# Patient Record
Sex: FEMALE | Race: BLACK OR AFRICAN AMERICAN | NOT HISPANIC OR LATINO | Employment: OTHER | URBAN - METROPOLITAN AREA
[De-identification: names, ages, dates, MRNs, and addresses within clinical notes are randomized per-mention and may not be internally consistent; named-entity substitution may affect disease eponyms.]

---

## 2017-01-06 ENCOUNTER — APPOINTMENT (OUTPATIENT)
Dept: WOMENS IMAGING | Facility: HOSPITAL | Age: 72
End: 2017-01-06

## 2017-01-06 ENCOUNTER — OFFICE VISIT (OUTPATIENT)
Dept: INTERNAL MEDICINE | Facility: CLINIC | Age: 72
End: 2017-01-06

## 2017-01-06 VITALS
SYSTOLIC BLOOD PRESSURE: 124 MMHG | DIASTOLIC BLOOD PRESSURE: 72 MMHG | WEIGHT: 196 LBS | BODY MASS INDEX: 31.5 KG/M2 | HEIGHT: 66 IN

## 2017-01-06 DIAGNOSIS — Z12.39 SCREENING FOR BREAST CANCER: ICD-10-CM

## 2017-01-06 DIAGNOSIS — I10 ESSENTIAL HYPERTENSION: ICD-10-CM

## 2017-01-06 DIAGNOSIS — I10 ESSENTIAL HYPERTENSION: Primary | ICD-10-CM

## 2017-01-06 DIAGNOSIS — E78.00 HYPERCHOLESTEROLEMIA: ICD-10-CM

## 2017-01-06 PROCEDURE — G0202 SCR MAMMO BI INCL CAD: HCPCS

## 2017-01-06 PROCEDURE — G0202 SCR MAMMO BI INCL CAD: HCPCS | Performed by: RADIOLOGY

## 2017-01-06 PROCEDURE — 99213 OFFICE O/P EST LOW 20 MIN: CPT

## 2017-01-06 RX ORDER — HYDROCHLOROTHIAZIDE 25 MG/1
12.5 TABLET ORAL DAILY
Qty: 90 TABLET | Refills: 3
Start: 2017-01-06 | End: 2017-11-15 | Stop reason: ALTCHOICE

## 2017-01-06 NOTE — MR AVS SNAPSHOT
Tanya Ahnorkimmy   1/6/2017 10:00 AM   Office Visit    Dept Phone:  [phone]   Encounter #:  [id number]    Provider:  RADIOLOGY HAN YIP   Department:  [facility] INTERNAL MEDICINE                Your Full Care Plan              Today's Medication Changes          These changes are accurate as of: 1/6/17 11:52 AM.  If you have any questions, ask your nurse or doctor.               Medication(s)that have changed:     hydrochlorothiazide 25 MG tablet   Commonly known as:  HYDRODIURIL   Take 0.5 tablets by mouth Daily.   What changed:  how much to take   Changed by:  Ajith Bauer MD            Where to Get Your Medications      Information about where to get these medications is not yet available     ! Ask your nurse or doctor about these medications     hydrochlorothiazide 25 MG tablet                  Your Updated Medication List          This list is accurate as of: 1/6/17 11:52 AM.  Always use your most recent med list.                amLODIPine 2.5 MG tablet   Commonly known as:  NORVASC   Take 1 tablet by mouth daily.       aspirin 81 MG chewable tablet       atorvastatin 20 MG tablet   Commonly known as:  LIPITOR       ferrous sulfate 325 (65 FE) MG tablet       hydrochlorothiazide 25 MG tablet   Commonly known as:  HYDRODIURIL   Take 0.5 tablets by mouth Daily.       irbesartan 300 MG tablet   Commonly known as:  AVAPRO       metoprolol succinate XL 50 MG 24 hr tablet   Commonly known as:  TOPROL-XL       promethazine-codeine 6.25-10 MG/5ML syrup   Commonly known as:  PHENERGAN with CODEINE   TAKE 1-2 TEASPOONFUL BY MOUTH EVERY 4 HOURS AS NEEDED       Vitamin D 2000 UNITS capsule               We Performed the Following     Mammo Screening Bilateral With CAD       You Were Diagnosed With        Codes Comments    Screening for breast cancer     ICD-10-CM: Z12.39  ICD-9-CM: V76.10 last mammogram 8/2015      Instructions     None    Patient Instructions  History      Upcoming Appointments     Visit Type Date Time Department    FOLLOW UP 2017 10:30 AM [facility]    MAMMO SCREEN BILATERAL 2017 10:00 AM [facility]    FOLLOW UP 4/10/2017  9:45 AM [facility]    FOLLOW UP 2017  8:00 AM [facility]      [employer] Signup     [facility] [employer] allows you to send messages to your doctor, view your test results, renew your prescriptions, schedule appointments, and more. To sign up, go to [employer] and click on the Sign Up Now link in the New User? box. Enter your [employer] Activation Code exactly as it appears below along with the last four digits of your Social Security Number and your Date of Birth () to complete the sign-up process. If you do not sign up before the expiration date, you must request a new code.    [employer] Activation Code: [email or web address]  Expires: 2017 11:09 AM    If you have questions, you can email [email or web address] or call [phone] to talk to our [employer] staff. Remember, [employer] is NOT to be used for urgent needs. For medical emergencies, dial 911.               Other Info from Your Visit           Your Appointments     Apr 10, 2017  9:45 AM EDT   Follow Up with NAREN Wilkins   [facility] INTERNAL MEDICINE (--)    [address]  [facility] [address] [phone]           Arrive 15 minutes prior to appointment.            Dec 19, 2017  8:00 AM EST   Follow Up with Concepcion Wright MD   [facility] CARDIOLOGY (--)    [address]  [facility] [address] [phone]           Arrive 15 minutes prior to appointment.              Allergies     No Known Allergies      Vital Signs     Smoking Status                   Former Smoker           Problems and Diagnoses Noted     Breast cancer screening          Results     Mammo Screening Bilateral With CAD

## 2017-01-06 NOTE — MR AVS SNAPSHOT
Tanya Greenwood   1/6/2017 10:30 AM   Office Visit    Dept Phone:  [phone]   Encounter #:  [id number]    Provider:  Ajith Bauer MD   Department:  [facility] INTERNAL MEDICINE                Your Full Care Plan              Today's Medication Changes          These changes are accurate as of: 1/6/17 11:10 AM.  If you have any questions, ask your nurse or doctor.               Medication(s)that have changed:     hydrochlorothiazide 25 MG tablet   Commonly known as:  HYDRODIURIL   Take 0.5 tablets by mouth Daily.   What changed:  how much to take   Changed by:  Ajith Bauer MD            Where to Get Your Medications      Information about where to get these medications is not yet available     ! Ask your nurse or doctor about these medications     hydrochlorothiazide 25 MG tablet                  Your Updated Medication List          This list is accurate as of: 1/6/17 11:10 AM.  Always use your most recent med list.                amLODIPine 2.5 MG tablet   Commonly known as:  NORVASC   Take 1 tablet by mouth daily.       aspirin 81 MG chewable tablet       atorvastatin 20 MG tablet   Commonly known as:  LIPITOR       ferrous sulfate 325 (65 FE) MG tablet       hydrochlorothiazide 25 MG tablet   Commonly known as:  HYDRODIURIL   Take 0.5 tablets by mouth Daily.       irbesartan 300 MG tablet   Commonly known as:  AVAPRO       metoprolol succinate XL 50 MG 24 hr tablet   Commonly known as:  TOPROL-XL       promethazine-codeine 6.25-10 MG/5ML syrup   Commonly known as:  PHENERGAN with CODEINE   TAKE 1-2 TEASPOONFUL BY MOUTH EVERY 4 HOURS AS NEEDED       Vitamin D 2000 UNITS capsule               You Were Diagnosed With        Codes Comments    Essential hypertension    -  Primary ICD-10-CM: I10  ICD-9-CM: 401.9 BP possibly over controlled: I rec a decrease in the dose of the diuretic. Monitor supne and sitting BP.    Hypercholesterolemia     ICD-10-CM:  E78.00  ICD-9-CM: 272.0     Essential hypertension     ICD-10-CM: I10  ICD-9-CM: 401.9 Rx HCTZ 25 mgm daily      Instructions     None    Patient Instructions History      Upcoming Appointments     Visit Type Date Time Department    FOLLOW UP 2017 10:30 AM [facility]    MAMMO SCREEN BILATERAL 2017 10:00 AM [facility]    FOLLOW UP 4/10/2017  9:45 AM [facility]    FOLLOW UP 2017  8:00 AM [facility]      [employer] Signup     [facility] [employer] allows you to send messages to your doctor, view your test results, renew your prescriptions, schedule appointments, and more. To sign up, go to [employer] and click on the Sign Up Now link in the New User? box. Enter your [employer] Activation Code exactly as it appears below along with the last four digits of your Social Security Number and your Date of Birth () to complete the sign-up process. If you do not sign up before the expiration date, you must request a new code.    [employer] Activation Code: [email or web address]  Expires: 2017 11:09 AM    If you have questions, you can email [email or web address] or call [phone] to talk to our [employer] staff. Remember, [employer] is NOT to be used for urgent needs. For medical emergencies, dial 911.               Other Info from Your Visit           Your Appointments     Apr 10, 2017  9:45 AM EDT   Follow Up with NAREN Wilkins   [facility] INTERNAL MEDICINE (--)    [address]  [facility] [place] [address] [phone]           Arrive 15 minutes prior to appointment.            Dec 19, 2017  8:00 AM EST   Follow Up with Concepcion Wright MD   [facility] CARDIOLOGY (--)    [address]  [facility] [address] [phone]           Arrive 15 minutes prior to appointment.              Allergies     No Known Allergies      Reason for Visit     Hypertension     Hyperlipidemia           Vital Signs     Blood  "Pressure Height Weight Body Mass Index Smoking Status       124/72 (BP Location: Left arm, Patient Position: Sitting, Cuff Size: Adult) 66\" (167.6 cm) 196 lb (88.9 kg) 31.64 kg/m2 Former Smoker       Problems and Diagnoses Noted     High blood pressure    High cholesterol        "

## 2017-01-06 NOTE — PROGRESS NOTES
"Sahara Greenwood is a 71 y.o. female.     Hypertension   This is a chronic problem. The problem is controlled. Associated symptoms include palpitations (mild:resolved). Pertinent negatives include no anxiety, blurred vision, chest pain (resolved), headaches, orthopnea, peripheral edema or shortness of breath. (\"rarly am dizziness; gets better later in fday) There are no associated agents to hypertension. Risk factors for coronary artery disease include dyslipidemia and post-menopausal state. Past treatments include angiotensin blockers, calcium channel blockers and diuretics. The current treatment provides significant improvement. There are no compliance problems.  There is no history of angina, kidney disease or CAD/MI.   Hyperlipidemia   This is a chronic problem. The problem is controlled. Pertinent negatives include no chest pain (resolved) or shortness of breath. Treatments tried: tolerating the atorvastatin.        The following portions of the patient's history were reviewed and updated as appropriate: allergies, current medications, past family history, past medical history, past social history, past surgical history and problem list.    Review of Systems   Constitutional: Negative for chills, fatigue, fever and unexpected weight change.   HENT: Negative for congestion, ear pain, postnasal drip, sinus pressure, sore throat and trouble swallowing.    Eyes: Negative for blurred vision and visual disturbance.   Respiratory: Negative for cough, chest tightness, shortness of breath and wheezing.    Cardiovascular: Positive for palpitations (mild:resolved). Negative for chest pain (resolved), orthopnea and leg swelling.   Gastrointestinal: Negative for abdominal pain, blood in stool, nausea and vomiting.   Endocrine: Negative for cold intolerance and heat intolerance.   Genitourinary: Negative for dysuria, frequency and urgency.   Musculoskeletal: Negative for arthralgias and joint swelling.   Skin: " Negative for color change.   Allergic/Immunologic: Negative for immunocompromised state.   Neurological: Negative for syncope, weakness and headaches.   Hematological: Does not bruise/bleed easily.       Objective   Physical Exam   Constitutional: She is oriented to person, place, and time. She appears well-developed and well-nourished. No distress.   HENT:   Head: Normocephalic and atraumatic.   Right Ear: External ear normal.   Left Ear: External ear normal.   Eyes: Conjunctivae and EOM are normal. Pupils are equal, round, and reactive to light. No scleral icterus.   Neck: Normal range of motion. Neck supple. No thyromegaly present.   Cardiovascular: Normal rate, regular rhythm, normal heart sounds and intact distal pulses.  Exam reveals no gallop and no friction rub.    No murmur heard.  Pulmonary/Chest: Effort normal and breath sounds normal. No respiratory distress.   Lymphadenopathy:     She has no cervical adenopathy.   Neurological: She is alert and oriented to person, place, and time.   Skin: Skin is warm and dry. No rash noted. No erythema.   Psychiatric: She has a normal mood and affect. Her behavior is normal.   Nursing note and vitals reviewed.      Assessment/Plan   Tanya was seen today for hypertension and hyperlipidemia.    Diagnoses and all orders for this visit:    Essential hypertension  Comments:  BP possibly over controlled: I rec a decrease in the dose of the diuretic. Monitor supne and sitting BP.  Orders:  -     hydrochlorothiazide (HYDRODIURIL) 25 MG tablet; Take 0.5 tablets by mouth Daily.    Hypercholesterolemia    Essential hypertension  Comments:  Rx HCTZ 25 mgm daily  Orders:  -     hydrochlorothiazide (HYDRODIURIL) 25 MG tablet; Take 0.5 tablets by mouth Daily.

## 2017-02-07 RX ORDER — METOPROLOL SUCCINATE 50 MG/1
TABLET, EXTENDED RELEASE ORAL
Qty: 30 TABLET | Refills: 2 | Status: SHIPPED | OUTPATIENT
Start: 2017-02-07 | End: 2017-05-07 | Stop reason: SDUPTHER

## 2017-03-26 DIAGNOSIS — I10 ESSENTIAL HYPERTENSION: ICD-10-CM

## 2017-03-27 RX ORDER — AMLODIPINE BESYLATE 2.5 MG/1
TABLET ORAL
Qty: 90 TABLET | Refills: 0 | Status: SHIPPED | OUTPATIENT
Start: 2017-03-27 | End: 2017-07-13 | Stop reason: SDUPTHER

## 2017-04-10 ENCOUNTER — TELEPHONE (OUTPATIENT)
Dept: INTERNAL MEDICINE | Facility: CLINIC | Age: 72
End: 2017-04-10

## 2017-04-10 ENCOUNTER — OFFICE VISIT (OUTPATIENT)
Dept: INTERNAL MEDICINE | Facility: CLINIC | Age: 72
End: 2017-04-10

## 2017-04-10 VITALS
OXYGEN SATURATION: 97 % | BODY MASS INDEX: 31.66 KG/M2 | SYSTOLIC BLOOD PRESSURE: 112 MMHG | HEART RATE: 54 BPM | WEIGHT: 197 LBS | DIASTOLIC BLOOD PRESSURE: 76 MMHG | HEIGHT: 66 IN

## 2017-04-10 DIAGNOSIS — I10 ESSENTIAL HYPERTENSION: Primary | ICD-10-CM

## 2017-04-10 DIAGNOSIS — E78.00 HYPERCHOLESTEROLEMIA: ICD-10-CM

## 2017-04-10 PROCEDURE — 99213 OFFICE O/P EST LOW 20 MIN: CPT | Performed by: NURSE PRACTITIONER

## 2017-04-10 NOTE — PATIENT INSTRUCTIONS
Fat and Cholesterol Restricted Diet  High levels of fat and cholesterol in your blood may lead to various health problems, such as diseases of the heart, blood vessels, gallbladder, liver, and pancreas. Fats are concentrated sources of energy that come in various forms. Certain types of fat, including saturated fat, may be harmful in excess. Cholesterol is a substance needed by your body in small amounts. Your body makes all the cholesterol it needs. Excess cholesterol comes from the food you eat.  When you have high levels of cholesterol and saturated fat in your blood, health problems can develop because the excess fat and cholesterol will gather along the walls of your blood vessels, causing them to narrow. Choosing the right foods will help you control your intake of fat and cholesterol. This will help keep the levels of these substances in your blood within normal limits and reduce your risk of disease.  WHAT IS MY PLAN?  Your health care provider recommends that you:  · Get no more than __________ % of the total calories in your daily diet from fat.  · Limit your intake of saturated fat to less than ______% of your total calories each day.  · Limit the amount of cholesterol in your diet to less than _________mg per day.  WHAT TYPES OF FAT SHOULD I CHOOSE?  · Choose healthy fats more often. Choose monounsaturated and polyunsaturated fats, such as olive and canola oil, flaxseeds, walnuts, almonds, and seeds.  · Eat more omega-3 fats. Good choices include salmon, mackerel, sardines, tuna, flaxseed oil, and ground flaxseeds. Aim to eat fish at least two times a week.  · Limit saturated fats. Saturated fats are primarily found in animal products, such as meats, butter, and cream. Plant sources of saturated fats include palm oil, palm kernel oil, and coconut oil.  · Avoid foods with partially hydrogenated oils in them. These contain trans fats. Examples of foods that contain trans fats are stick margarine, some tub  "margarines, cookies, crackers, and other baked goods.  WHAT GENERAL GUIDELINES DO I NEED TO FOLLOW?  These guidelines for healthy eating will help you control your intake of fat and cholesterol:  · Check food labels carefully to identify foods with trans fats or high amounts of saturated fat.  · Fill one half of your plate with vegetables and green salads.  · Fill one fourth of your plate with whole grains. Look for the word \"whole\" as the first word in the ingredient list.  · Fill one fourth of your plate with lean protein foods.  · Limit fruit to two servings a day. Choose fruit instead of juice.  · Eat more foods that contain soluble fiber. Examples of foods that contain this type of fiber are apples, broccoli, carrots, beans, peas, and barley. Aim to get 20-30 g of fiber per day.  · Eat more home-cooked food and less restaurant, buffet, and fast food.  · Limit or avoid alcohol.  · Limit foods high in starch and sugar.  · Limit fried foods.  · Cook foods using methods other than frying. Baking, boiling, grilling, and broiling are all great options.  · Lose weight if you are overweight. Losing just 5-10% of your initial body weight can help your overall health and prevent diseases such as diabetes and heart disease.  WHAT FOODS CAN I EAT?  Grains  Whole grains, such as whole wheat or whole grain breads, crackers, cereals, and pasta. Unsweetened oatmeal, bulgur, barley, quinoa, or brown rice. Corn or whole wheat flour tortillas.  Vegetables  Fresh or frozen vegetables (raw, steamed, roasted, or grilled). Green salads.  Fruits  All fresh, canned (in natural juice), or frozen fruits.  Meat and Other Protein Products  Ground beef (85% or leaner), grass-fed beef, or beef trimmed of fat. Skinless chicken or turkey. Ground chicken or turkey. Pork trimmed of fat. All fish and seafood. Eggs. Dried beans, peas, or lentils. Unsalted nuts or seeds. Unsalted canned or dry beans.  Dairy  Low-fat dairy products, such as skim or " 1% milk, 2% or reduced-fat cheeses, low-fat ricotta or cottage cheese, or plain low-fat yogurt.  Fats and Oils  Tub margarines without trans fats. Light or reduced-fat mayonnaise and salad dressings. Avocado. Olive, canola, sesame, or safflower oils. Natural peanut or almond butter (choose ones without added sugar and oil).  The items listed above may not be a complete list of recommended foods or beverages. Contact your dietitian for more options.  WHAT FOODS ARE NOT RECOMMENDED?  Grains  White bread. White pasta. White rice. Cornbread. Bagels, pastries, and croissants. Crackers that contain trans fat.  Vegetables  White potatoes. Corn. Creamed or fried vegetables. Vegetables in a cheese sauce.  Fruits  Dried fruits. Canned fruit in light or heavy syrup. Fruit juice.  Meat and Other Protein Products  Fatty cuts of meat. Ribs, chicken wings, muro, sausage, bologna, salami, chitterlings, fatback, hot dogs, bratwurst, and packaged luncheon meats. Liver and organ meats.  Dairy  Whole or 2% milk, cream, half-and-half, and cream cheese. Whole milk cheeses. Whole-fat or sweetened yogurt. Full-fat cheeses. Nondairy creamers and whipped toppings. Processed cheese, cheese spreads, or cheese curds.  Sweets and Desserts  Corn syrup, sugars, honey, and molasses. Candy. Jam and jelly. Syrup. Sweetened cereals. Cookies, pies, cakes, donuts, muffins, and ice cream.  Fats and Oils  Butter, stick margarine, lard, shortening, ghee, or muro fat. Coconut, palm kernel, or palm oils.  Beverages  Alcohol. Sweetened drinks (such as sodas, lemonade, and fruit drinks or punches).  The items listed above may not be a complete list of foods and beverages to avoid. Contact your dietitian for more information.     This information is not intended to replace advice given to you by your health care provider. Make sure you discuss any questions you have with your health care provider.     Document Released: 12/18/2006 Document Revised: 01/08/2016  Document Reviewed: 03/18/2015  [employer] Interactive Patient Education ©2016 [employer]

## 2017-04-10 NOTE — TELEPHONE ENCOUNTER
----- Message from NAREN Wilkins sent at 4/10/2017 11:04 AM EDT -----  Regarding: medical records  Please obtain recent  labs from Dr Clarence Sepulveda (nephrologist). Thanks .

## 2017-04-11 ENCOUNTER — TELEPHONE (OUTPATIENT)
Dept: INTERNAL MEDICINE | Facility: CLINIC | Age: 72
End: 2017-04-11

## 2017-04-11 DIAGNOSIS — E78.00 HYPERCHOLESTEROLEMIA: Primary | ICD-10-CM

## 2017-04-11 RX ORDER — EZETIMIBE 10 MG/1
10 TABLET ORAL DAILY
Qty: 30 TABLET | Refills: 3 | Status: SHIPPED | OUTPATIENT
Start: 2017-04-11 | End: 2017-08-17 | Stop reason: SDUPTHER

## 2017-04-11 NOTE — TELEPHONE ENCOUNTER
I called patient and informed her that I received lab work from nephrologist. Her cholesterol levels remain elevated and worst. Will restart zetia (previously stopped due to intermittent cramps, but she continue with them). She will also work on low fat/cholesterol diet. Will need to recheck levels at next office visit.

## 2017-05-08 RX ORDER — METOPROLOL SUCCINATE 50 MG/1
TABLET, EXTENDED RELEASE ORAL
Qty: 30 TABLET | Refills: 4 | Status: SHIPPED | OUTPATIENT
Start: 2017-05-08 | End: 2017-10-05 | Stop reason: SDUPTHER

## 2017-06-26 DIAGNOSIS — I10 ESSENTIAL HYPERTENSION: ICD-10-CM

## 2017-06-27 RX ORDER — AMLODIPINE BESYLATE 2.5 MG/1
TABLET ORAL
Qty: 90 TABLET | Refills: 1 | Status: SHIPPED | OUTPATIENT
Start: 2017-06-27 | End: 2018-03-14 | Stop reason: SDUPTHER

## 2017-07-13 ENCOUNTER — OFFICE VISIT (OUTPATIENT)
Dept: INTERNAL MEDICINE | Facility: CLINIC | Age: 72
End: 2017-07-13

## 2017-07-13 VITALS
DIASTOLIC BLOOD PRESSURE: 90 MMHG | HEIGHT: 66 IN | WEIGHT: 199 LBS | SYSTOLIC BLOOD PRESSURE: 136 MMHG | BODY MASS INDEX: 31.98 KG/M2

## 2017-07-13 DIAGNOSIS — Z00.00 PERIODIC HEALTH ASSESSMENT, GENERAL SCREENING, ADULT: Primary | ICD-10-CM

## 2017-07-13 DIAGNOSIS — F51.01 PRIMARY INSOMNIA: ICD-10-CM

## 2017-07-13 DIAGNOSIS — I10 ESSENTIAL HYPERTENSION: ICD-10-CM

## 2017-07-13 DIAGNOSIS — E78.00 HYPERCHOLESTEROLEMIA: ICD-10-CM

## 2017-07-13 PROCEDURE — G0439 PPPS, SUBSEQ VISIT: HCPCS

## 2017-07-13 PROCEDURE — 99213 OFFICE O/P EST LOW 20 MIN: CPT

## 2017-07-13 RX ORDER — DOXEPIN HYDROCHLORIDE 10 MG/1
10 CAPSULE ORAL NIGHTLY
Qty: 30 CAPSULE | Refills: 3 | Status: SHIPPED | OUTPATIENT
Start: 2017-07-13 | End: 2018-05-22

## 2017-07-13 NOTE — PROGRESS NOTES
QUICK REFERENCE INFORMATION:  The ABCs of the Annual Wellness Visit    Subsequent Medicare Wellness Visit    HEALTH RISK ASSESSMENT    1945    Recent Hospitalizations:  No hospitalization(s) within the last year..        Current Medical Providers:  Patient Care Team:  Ajith Bauer MD as PCP - General  NAREN Wilkins as PCP - Claims Attributed  Concepcion Wright MD as Consulting Physician (Cardiology)  Clarence Carvajal MD as Consulting Physician (Nephrology)        Smoking Status:  History   Smoking Status   • Former Smoker   • Types: Cigarettes   • Quit date: 1978   Smokeless Tobacco   • Never Used       Alcohol Consumption:  History   Alcohol Use No     Comment: caffeine use       Depression Screen:   PHQ-9 Depression Screening 7/13/2017   Little interest or pleasure in doing things 1   Feeling down, depressed, or hopeless 0   Trouble falling or staying asleep, or sleeping too much -   Feeling tired or having little energy -   Poor appetite or overeating -   Feeling bad about yourself - or that you are a failure or have let yourself or your family down -   Trouble concentrating on things, such as reading the newspaper or watching television -   Moving or speaking so slowly that other people could have noticed. Or the opposite - being so fidgety or restless that you have been moving around a lot more than usual -   Thoughts that you would be better off dead, or of hurting yourself in some way -   PHQ-9 Total Score 1   If you checked off any problems, how difficult have these problems made it for you to do your work, take care of things at home, or get along with other people? -       Health Habits and Functional and Cognitive Screening:  Functional & Cognitive Status 7/13/2017   Do you have difficulty preparing food and eating? No   Do you have difficulty bathing yourself? No   Do you have difficulty getting dressed? No   Do you have difficulty using the toilet? No   Do you have difficulty moving  around from place to place? No   In the past year have you fallen or experienced a near fall? No   Do you need help using the phone?  No   Are you deaf or do you have serious difficulty hearing?  No   Do you need help with transportation? No   Do you need help shopping? No   Do you need help preparing meals?  No   Do you need help with housework?  No   Do you need help with laundry? No   Do you need help taking your medications? No   Do you need help managing money? No   Do you have difficulty concentrating, remembering or making decisions? -       Health Habits  Current Diet: Unhealthy Diet  Dental Exam: Up to date  Eye Exam: Up to date  Exercise (times per week): 2 times per week  Current Exercise Activities Include: Walking      Does the patient have evidence of cognitive impairment? Yes    Aspirin use counseling: Taking ASA appropriately as indicated      Recent Lab Results:  CMP:  Lab Results   Component Value Date    BUN 13 09/30/2016    CREATININE 1.21 (H) 09/30/2016    EGFRIFNONA  09/22/2016      Comment:      <15 Indicative of kidney failure.    EGFRIFAFRI 53 (L) 09/30/2016    BCR 10.7 09/30/2016     09/30/2016    K 3.6 09/30/2016    CO2 25.7 09/30/2016    CALCIUM 10.1 09/30/2016    ALBUMIN 4.60 09/30/2016    LABIL2 1.6 09/30/2016    BILITOT 0.9 09/30/2016    ALKPHOS 71 09/30/2016    AST 19 09/30/2016    ALT 13 09/30/2016     Lipid Panel:  Lab Results   Component Value Date    CHOL 227 (H) 09/22/2016    TRIG 145 09/22/2016    HDL 47 09/22/2016    VLDL 29 09/22/2016    LDLCALC 151 (H) 09/22/2016    LDLHDL 3.21 09/22/2016     HbA1c:       Visual Acuity:  No exam data present    Age-appropriate Screening Schedule:  Refer to the list below for future screening recommendations based on patient's age, sex and/or medical conditions. Orders for these recommended tests are listed in the plan section. The patient has been provided with a written plan.    Health Maintenance   Topic Date Due   • ZOSTER VACCINE   04/04/2016   • INFLUENZA VACCINE  08/01/2017   • LIPID PANEL  09/22/2017   • PNEUMOCOCCAL VACCINES (65+ LOW/MEDIUM RISK) (2 of 2 - PPSV23) 10/25/2017   • DXA SCAN  09/15/2018   • MAMMOGRAM  01/06/2019   • COLONOSCOPY  05/31/2021   • TDAP/TD VACCINES (2 - Td) 10/25/2026        Subjective   History of Present Illness    Tanya Greenwood is a 72 y.o. female who presents for an Subsequent Wellness Visit.    The following portions of the patient's history were reviewed and updated as appropriate: allergies, current medications, past family history, past medical history, past social history, past surgical history and problem list.    Outpatient Medications Prior to Visit   Medication Sig Dispense Refill   • amLODIPine (NORVASC) 2.5 MG tablet TAKE 1 TABLET BY MOUTH DAILY 90 tablet 1   • aspirin 81 MG chewable tablet Chew 81 mg Daily.     • atorvastatin (LIPITOR) 20 MG tablet TK 1 T PO QHS  3   • Cholecalciferol (VITAMIN D) 2000 UNITS capsule Take  by mouth.     • ezetimibe (ZETIA) 10 MG tablet Take 1 tablet by mouth Daily. 30 tablet 3   • ferrous sulfate 325 (65 FE) MG tablet Take 65 mg by mouth Daily With Breakfast.     • hydrochlorothiazide (HYDRODIURIL) 25 MG tablet Take 0.5 tablets by mouth Daily. 90 tablet 3   • irbesartan (AVAPRO) 300 MG tablet 0.5 tablets Daily. Unless over 140 then one QD  3   • metoprolol succinate XL (TOPROL-XL) 50 MG 24 hr tablet TAKE 1/2 TABLET BY MOUTH TWICE DAILY 30 tablet 4   • amLODIPine (NORVASC) 2.5 MG tablet TAKE 1 TABLET BY MOUTH DAILY 90 tablet 0     No facility-administered medications prior to visit.        Patient Active Problem List   Diagnosis   • Hypercholesterolemia   • Essential hypertension   • Coronary artery disease   • Chronic renal insufficiency       Advance Care Planning:  has NO advance directive - information provided to the patient today    Identification of Risk Factors:  Risk factors include: weight , cardiovascular risk and Chronic insomnia.    Review of  "Systems    Compared to one year ago, the patient feels her physical health is better.  Compared to one year ago, the patient feels her mental health is better.    Objective     Physical Exam    Vitals:    07/13/17 0842   BP: 136/90   BP Location: Left arm   Weight: 199 lb (90.3 kg)   Height: 66\" (167.6 cm)       Body mass index is 32.12 kg/(m^2).  Discussed the patient's BMI with her. The BMI is above average; BMI management plan is completed.    Assessment/Plan   Patient Self-Management and Personalized Health Advice  The patient has been provided with information about: weight management and She is exercising almost daily and preventive services including:   · TdaP vaccine, Weight management..    Visit Diagnoses:  No diagnosis found.    No orders of the defined types were placed in this encounter.      Outpatient Encounter Prescriptions as of 7/13/2017   Medication Sig Dispense Refill   • amLODIPine (NORVASC) 2.5 MG tablet TAKE 1 TABLET BY MOUTH DAILY 90 tablet 1   • aspirin 81 MG chewable tablet Chew 81 mg Daily.     • atorvastatin (LIPITOR) 20 MG tablet TK 1 T PO QHS  3   • Cholecalciferol (VITAMIN D) 2000 UNITS capsule Take  by mouth.     • ezetimibe (ZETIA) 10 MG tablet Take 1 tablet by mouth Daily. 30 tablet 3   • ferrous sulfate 325 (65 FE) MG tablet Take 65 mg by mouth Daily With Breakfast.     • hydrochlorothiazide (HYDRODIURIL) 25 MG tablet Take 0.5 tablets by mouth Daily. 90 tablet 3   • irbesartan (AVAPRO) 300 MG tablet 0.5 tablets Daily. Unless over 140 then one QD  3   • metoprolol succinate XL (TOPROL-XL) 50 MG 24 hr tablet TAKE 1/2 TABLET BY MOUTH TWICE DAILY 30 tablet 4   • [DISCONTINUED] amLODIPine (NORVASC) 2.5 MG tablet TAKE 1 TABLET BY MOUTH DAILY 90 tablet 0     No facility-administered encounter medications on file as of 7/13/2017.        Reviewed use of high risk medication in the elderly: not applicable  Reviewed for potential of harmful drug interactions in the elderly: not " applicable    Follow Up:  No Follow-up on file.     An After Visit Summary and PPPS with all of these plans were given to the patient.

## 2017-07-13 NOTE — PROGRESS NOTES
Sahara Greenwood is a 72 y.o. female.     Hyperlipidemia   This is a chronic problem. The problem is controlled. Pertinent negatives include no chest pain or shortness of breath. Treatments tried: On Atorvastatin and describes episodic hand cramps. Risk factors for coronary artery disease include dyslipidemia, hypertension and post-menopausal.   Hypertension   This is a chronic problem. The current episode started more than 1 year ago. The problem has been resolved since onset. The problem is controlled. Pertinent negatives include no anxiety, chest pain, headaches, orthopnea, palpitations, peripheral edema or shortness of breath. There are no associated agents to hypertension. Past treatments include angiotensin blockers and diuretics. The current treatment provides significant improvement. There are no compliance problems.  Hypertensive end-organ damage includes CAD/MI.   Insomnia   This is a chronic problem. The current episode started more than 1 year ago (Primarily is difficulty going back to sleep after awakening in the night.). The problem occurs daily. The problem has been unchanged. Pertinent negatives include no abdominal pain, arthralgias, chest pain, chills, congestion, coughing, fatigue, fever, headaches, joint swelling, nausea, sore throat, vomiting or weakness.        The following portions of the patient's history were reviewed and updated as appropriate: allergies, current medications, past family history, past medical history, past social history, past surgical history and problem list.    Review of Systems   Constitutional: Negative for chills, fatigue, fever and unexpected weight change.   HENT: Negative for congestion, ear pain, postnasal drip, sinus pressure, sore throat and trouble swallowing.    Eyes: Negative for visual disturbance.   Respiratory: Negative for cough, chest tightness, shortness of breath and wheezing.    Cardiovascular: Negative for chest pain, palpitations,  orthopnea and leg swelling.   Gastrointestinal: Negative for abdominal pain, blood in stool, nausea and vomiting.   Endocrine: Negative for cold intolerance and heat intolerance.   Genitourinary: Negative for dysuria, frequency and urgency.   Musculoskeletal: Negative for arthralgias and joint swelling.   Skin: Negative for color change.   Allergic/Immunologic: Negative for immunocompromised state.   Neurological: Negative for syncope, weakness and headaches.   Hematological: Does not bruise/bleed easily.   Psychiatric/Behavioral: Positive for sleep disturbance. The patient has insomnia.        Objective   Physical Exam   Constitutional: She is oriented to person, place, and time. She appears well-developed and well-nourished. No distress.   HENT:   Head: Normocephalic and atraumatic.   Eyes: Conjunctivae and EOM are normal. Pupils are equal, round, and reactive to light. No scleral icterus.   Neck: Normal range of motion. Neck supple. No thyromegaly present.   Cardiovascular: Normal rate, regular rhythm, normal heart sounds and intact distal pulses.  Exam reveals no gallop and no friction rub.    No murmur heard.  Pulmonary/Chest: Effort normal and breath sounds normal. No respiratory distress.   Lymphadenopathy:     She has no cervical adenopathy.   Neurological: She is alert and oriented to person, place, and time.   Skin: Skin is warm and dry. No rash noted. No erythema.   Psychiatric: She has a normal mood and affect. Her behavior is normal.   Nursing note and vitals reviewed.      Assessment/Plan   Tanya was seen today for awv, hypertension and insomnia.    Diagnoses and all orders for this visit:    Periodic health assessment, general screening, adult    Essential hypertension    Hypercholesterolemia    Primary insomnia  -     doxepin (SINEquan) 10 MG capsule; Take 1 capsule by mouth Every Night. Take approx 1 hour before bedtime.

## 2017-08-17 DIAGNOSIS — E78.00 HYPERCHOLESTEROLEMIA: ICD-10-CM

## 2017-08-17 RX ORDER — EZETIMIBE 10 MG/1
TABLET ORAL
Qty: 30 TABLET | Refills: 5 | Status: SHIPPED | OUTPATIENT
Start: 2017-08-17 | End: 2018-01-29 | Stop reason: SDUPTHER

## 2017-10-05 RX ORDER — METOPROLOL SUCCINATE 50 MG/1
TABLET, EXTENDED RELEASE ORAL
Qty: 30 TABLET | Refills: 3 | Status: SHIPPED | OUTPATIENT
Start: 2017-10-05 | End: 2018-02-06 | Stop reason: SDUPTHER

## 2017-11-15 ENCOUNTER — OFFICE VISIT (OUTPATIENT)
Dept: INTERNAL MEDICINE | Facility: CLINIC | Age: 72
End: 2017-11-15

## 2017-11-15 VITALS
OXYGEN SATURATION: 98 % | SYSTOLIC BLOOD PRESSURE: 122 MMHG | HEART RATE: 61 BPM | WEIGHT: 198 LBS | HEIGHT: 66 IN | DIASTOLIC BLOOD PRESSURE: 74 MMHG | BODY MASS INDEX: 31.82 KG/M2

## 2017-11-15 DIAGNOSIS — I10 ESSENTIAL HYPERTENSION: Primary | ICD-10-CM

## 2017-11-15 DIAGNOSIS — N18.2 CHRONIC RENAL INSUFFICIENCY, STAGE 2 (MILD): ICD-10-CM

## 2017-11-15 DIAGNOSIS — F51.01 PRIMARY INSOMNIA: ICD-10-CM

## 2017-11-15 DIAGNOSIS — E78.00 HYPERCHOLESTEROLEMIA: ICD-10-CM

## 2017-11-15 LAB
ALBUMIN SERPL-MCNC: 4.4 G/DL (ref 3.5–5.2)
ALBUMIN/GLOB SERPL: 1.3 G/DL
ALP SERPL-CCNC: 74 U/L (ref 39–117)
ALT SERPL W P-5'-P-CCNC: 15 U/L (ref 1–33)
ANION GAP SERPL CALCULATED.3IONS-SCNC: 11.1 MMOL/L
AST SERPL-CCNC: 19 U/L (ref 1–32)
BASOPHILS # BLD AUTO: 0.02 10*3/MM3 (ref 0–0.2)
BASOPHILS NFR BLD AUTO: 0.4 % (ref 0–2)
BILIRUB SERPL-MCNC: 1 MG/DL (ref 0.1–1.2)
BUN BLD-MCNC: 17 MG/DL (ref 8–23)
BUN/CREAT SERPL: 13.3 (ref 7–25)
CALCIUM SPEC-SCNC: 10 MG/DL (ref 8.6–10.5)
CHLORIDE SERPL-SCNC: 106 MMOL/L (ref 98–107)
CHOLEST SERPL-MCNC: 171 MG/DL (ref 0–200)
CO2 SERPL-SCNC: 25.9 MMOL/L (ref 22–29)
CREAT BLD-MCNC: 1.28 MG/DL (ref 0.57–1)
DEPRECATED RDW RBC AUTO: 41 FL (ref 37–54)
EOSINOPHIL # BLD AUTO: 0.11 10*3/MM3 (ref 0–0.7)
EOSINOPHIL NFR BLD AUTO: 2 % (ref 0–5)
ERYTHROCYTE [DISTWIDTH] IN BLOOD BY AUTOMATED COUNT: 13.4 % (ref 11.5–15)
GFR SERPL CREATININE-BSD FRML MDRD: 50 ML/MIN/1.73
GLOBULIN UR ELPH-MCNC: 3.5 GM/DL
GLUCOSE BLD-MCNC: 98 MG/DL (ref 65–99)
HCT VFR BLD AUTO: 34.5 % (ref 34.1–44.9)
HDLC SERPL-MCNC: 47 MG/DL (ref 40–60)
HGB BLD-MCNC: 11.5 G/DL (ref 11.2–15.7)
LDLC SERPL CALC-MCNC: 105 MG/DL (ref 0–100)
LDLC/HDLC SERPL: 2.24 {RATIO}
LYMPHOCYTES # BLD AUTO: 2.26 10*3/MM3 (ref 0.8–7)
LYMPHOCYTES NFR BLD AUTO: 40.1 % (ref 10–60)
MCH RBC QN AUTO: 28.5 PG (ref 26–34)
MCHC RBC AUTO-ENTMCNC: 33.3 G/DL (ref 31–37)
MCV RBC AUTO: 85.4 FL (ref 80–100)
MONOCYTES # BLD AUTO: 0.48 10*3/MM3 (ref 0–1)
MONOCYTES NFR BLD AUTO: 8.5 % (ref 0–13)
NEUTROPHILS # BLD AUTO: 2.77 10*3/MM3 (ref 1–11)
NEUTROPHILS NFR BLD AUTO: 49 % (ref 30–85)
PLATELET # BLD AUTO: 174 10*3/MM3 (ref 150–450)
PMV BLD AUTO: 10.5 FL (ref 6–12)
POTASSIUM BLD-SCNC: 4.3 MMOL/L (ref 3.5–5.2)
PROT SERPL-MCNC: 7.9 G/DL (ref 6–8.5)
RBC # BLD AUTO: 4.04 10*6/MM3 (ref 3.93–5.22)
SODIUM BLD-SCNC: 143 MMOL/L (ref 136–145)
TRIGL SERPL-MCNC: 94 MG/DL (ref 0–150)
VLDLC SERPL-MCNC: 18.8 MG/DL (ref 5–40)
WBC NRBC COR # BLD: 5.64 10*3/MM3 (ref 5–10)

## 2017-11-15 PROCEDURE — 36415 COLL VENOUS BLD VENIPUNCTURE: CPT | Performed by: NURSE PRACTITIONER

## 2017-11-15 PROCEDURE — 85025 COMPLETE CBC W/AUTO DIFF WBC: CPT | Performed by: NURSE PRACTITIONER

## 2017-11-15 PROCEDURE — 99214 OFFICE O/P EST MOD 30 MIN: CPT | Performed by: NURSE PRACTITIONER

## 2017-11-15 PROCEDURE — 80053 COMPREHEN METABOLIC PANEL: CPT | Performed by: NURSE PRACTITIONER

## 2017-11-15 PROCEDURE — 80061 LIPID PANEL: CPT | Performed by: NURSE PRACTITIONER

## 2017-11-15 RX ORDER — FUROSEMIDE 40 MG/1
40 TABLET ORAL DAILY
COMMUNITY

## 2017-11-15 NOTE — PROGRESS NOTES
Subjective   Tanya Greenwood is a 72 y.o. female.     HPI Comments: She is checking her blood pressure daily and readings less than 140/90. She exercises 3 times week (ride stationery bike). Her last eye exam last month. Her last dental exam couple years ago. She went to kidney doctor yesterday for routine.     Hypertension   This is a chronic problem. The current episode started more than 1 year ago. The problem is controlled. Associated symptoms include palpitations (intermittent, chronic ). Pertinent negatives include no anxiety, blurred vision, chest pain, headaches, orthopnea, peripheral edema, PND or shortness of breath. Past treatments include beta blockers, calcium channel blockers, diuretics and angiotensin blockers. The current treatment provides significant improvement. Hypertensive end-organ damage includes kidney disease.   Hyperlipidemia   This is a chronic problem. The current episode started more than 1 year ago. The problem is controlled. Recent lipid tests were reviewed and are normal. Pertinent negatives include no chest pain, leg pain, myalgias or shortness of breath. Current antihyperlipidemic treatment includes statins (zetia ). The current treatment provides significant improvement of lipids. Risk factors for coronary artery disease include hypertension and post-menopausal.        The following portions of the patient's history were reviewed and updated as appropriate: allergies, current medications, past family history, past medical history, past social history, past surgical history and problem list.    Review of Systems   Eyes: Negative for blurred vision and visual disturbance.   Respiratory: Negative for chest tightness, shortness of breath and wheezing.    Cardiovascular: Positive for palpitations (intermittent, chronic ). Negative for chest pain, orthopnea, leg swelling and PND.   Musculoskeletal: Negative for myalgias.   Neurological: Negative for dizziness and headaches.    Psychiatric/Behavioral: Positive for sleep disturbance (doing ). Negative for dysphoric mood and suicidal ideas. The patient is not nervous/anxious.        Objective   Physical Exam   Constitutional: She is oriented to person, place, and time. She appears well-developed and well-nourished.   HENT:   Head: Normocephalic.   Nose: Nose normal.   Neck: Carotid bruit is not present. No thyroid mass and no thyromegaly present.   Cardiovascular: Regular rhythm and normal heart sounds.  Exam reveals no S3 and no S4.    No murmur heard.  Pulses:       Dorsalis pedis pulses are 2+ on the right side, and 2+ on the left side.        Posterior tibial pulses are 2+ on the right side, and 2+ on the left side.   Repeat bp left arm 132/78  No pedal edema    Pulmonary/Chest: Effort normal and breath sounds normal. She has no decreased breath sounds. She has no wheezes. She has no rhonchi. She has no rales.   Musculoskeletal: She exhibits no edema.   Neurological: She is alert and oriented to person, place, and time. Gait normal.   Skin: Skin is warm and dry.   Psychiatric: She has a normal mood and affect.       Assessment/Plan   Tanya was seen today for hypertension and hyperlipidemia.    Diagnoses and all orders for this visit:    Essential hypertension  Comments:  goal of bp less than 140/90; low salt diet   Orders:  -     CBC Auto Differential; Future  -     Comprehensive Metabolic Panel; Future    Hypercholesterolemia  -     Lipid Panel; Future    Primary insomnia  Comments:  doing ok doxepin     Chronic renal insufficiency, stage 2 (mild)  Comments:  stable; saw nephrologist 11/14/2017    She request labs be sent to nephrologist. Will send once available.

## 2017-12-22 DIAGNOSIS — I10 ESSENTIAL HYPERTENSION: ICD-10-CM

## 2017-12-26 RX ORDER — AMLODIPINE BESYLATE 2.5 MG/1
TABLET ORAL
Qty: 90 TABLET | Refills: 1 | Status: SHIPPED | OUTPATIENT
Start: 2017-12-26 | End: 2018-10-01 | Stop reason: SDUPTHER

## 2018-01-29 RX ORDER — EZETIMIBE 10 MG/1
10 TABLET ORAL DAILY
Qty: 30 TABLET | Refills: 5 | Status: SHIPPED | OUTPATIENT
Start: 2018-01-29 | End: 2018-06-19 | Stop reason: SDUPTHER

## 2018-01-30 ENCOUNTER — TELEPHONE (OUTPATIENT)
Dept: INTERNAL MEDICINE | Facility: CLINIC | Age: 73
End: 2018-01-30

## 2018-01-30 NOTE — TELEPHONE ENCOUNTER
Gely-Pt asking us to change Rx.  Ezetimibe sent in yesterday.  Her daughter picked up for her, but it was $140 for 30 days.  She asked if you would order another medication.  Please advise.

## 2018-01-30 NOTE — TELEPHONE ENCOUNTER
----- Message from Izabela Whitten sent at 1/30/2018 11:14 AM EST -----  Contact: pt - Dr Bauer' pt - RE: medication  Pt calling and would like a return call regarding a Rx that was prescribed. Pt informs insurance is not covering. Pt would like to know if there another Rx similar to this? Could you please call pt to discuss? Please advise. Thanks    ezetimibe (ZETIA) 10 MG tablet    [facility] – [place], [place] [id number] [place] [address] AT [facility] [place] - [phone] [place] [phone] FX    Pt # [phone] or [phone]

## 2018-01-30 NOTE — TELEPHONE ENCOUNTER
Per Alonzo at [facility], pt had requested refill on previous Rx (which was for brand name).  That is why it was so expensive.  He said that she can  next month for generic (since she had already picked up brand name, the one we sent yesterday was too soon to fill).

## 2018-02-06 ENCOUNTER — TELEPHONE (OUTPATIENT)
Dept: INTERNAL MEDICINE | Facility: CLINIC | Age: 73
End: 2018-02-06

## 2018-02-06 RX ORDER — METOPROLOL SUCCINATE 50 MG/1
TABLET, EXTENDED RELEASE ORAL
Qty: 30 TABLET | Refills: 3 | Status: SHIPPED | OUTPATIENT
Start: 2018-02-06 | End: 2018-06-11 | Stop reason: SDUPTHER

## 2018-02-06 NOTE — TELEPHONE ENCOUNTER
----- Message from Donna Lind sent at 2/6/2018 11:33 AM EST -----  Contact: Patient   Patient called requesting refill on     metoprolol succinate XL (TOPROL-XL) 50 MG 24 hr tablet.  Please advise.     Patient:  [phone] - cell                [phone] - [place]     Pharmacy:  [facility] [address] [place] [address] - [phone] [place] [phone]

## 2018-03-14 ENCOUNTER — OFFICE VISIT (OUTPATIENT)
Dept: INTERNAL MEDICINE | Facility: CLINIC | Age: 73
End: 2018-03-14

## 2018-03-14 VITALS
WEIGHT: 202 LBS | HEIGHT: 66 IN | DIASTOLIC BLOOD PRESSURE: 82 MMHG | HEART RATE: 61 BPM | OXYGEN SATURATION: 98 % | SYSTOLIC BLOOD PRESSURE: 122 MMHG | BODY MASS INDEX: 32.47 KG/M2

## 2018-03-14 DIAGNOSIS — Z11.59 SCREENING FOR VIRAL DISEASE: ICD-10-CM

## 2018-03-14 DIAGNOSIS — N18.2 CHRONIC RENAL INSUFFICIENCY, STAGE 2 (MILD): ICD-10-CM

## 2018-03-14 DIAGNOSIS — H61.22 IMPACTED CERUMEN OF LEFT EAR: ICD-10-CM

## 2018-03-14 DIAGNOSIS — I10 ESSENTIAL HYPERTENSION: Primary | ICD-10-CM

## 2018-03-14 DIAGNOSIS — R25.2 LEG CRAMPS: ICD-10-CM

## 2018-03-14 DIAGNOSIS — E78.00 HYPERCHOLESTEROLEMIA: ICD-10-CM

## 2018-03-14 DIAGNOSIS — M79.89 SWELLING OF LEFT HAND: ICD-10-CM

## 2018-03-14 LAB
ALBUMIN SERPL-MCNC: 4.3 G/DL (ref 3.5–5.2)
ALBUMIN/GLOB SERPL: 1.3 G/DL
ALP SERPL-CCNC: 73 U/L (ref 39–117)
ALT SERPL W P-5'-P-CCNC: 18 U/L (ref 1–33)
ANION GAP SERPL CALCULATED.3IONS-SCNC: 11.1 MMOL/L
AST SERPL-CCNC: 21 U/L (ref 1–32)
BASOPHILS # BLD AUTO: 0.02 10*3/MM3 (ref 0–0.2)
BASOPHILS NFR BLD AUTO: 0.3 % (ref 0–2)
BILIRUB SERPL-MCNC: 0.8 MG/DL (ref 0.1–1.2)
BUN BLD-MCNC: 16 MG/DL (ref 8–23)
BUN/CREAT SERPL: 12.8 (ref 7–25)
CALCIUM SPEC-SCNC: 10.2 MG/DL (ref 8.6–10.5)
CHLORIDE SERPL-SCNC: 106 MMOL/L (ref 98–107)
CHOLEST SERPL-MCNC: 207 MG/DL (ref 0–200)
CO2 SERPL-SCNC: 26.9 MMOL/L (ref 22–29)
CREAT BLD-MCNC: 1.25 MG/DL (ref 0.57–1)
DEPRECATED RDW RBC AUTO: 40.3 FL (ref 37–54)
EOSINOPHIL # BLD AUTO: 0.12 10*3/MM3 (ref 0–0.7)
EOSINOPHIL NFR BLD AUTO: 2.1 % (ref 0–5)
ERYTHROCYTE [DISTWIDTH] IN BLOOD BY AUTOMATED COUNT: 13.4 % (ref 11.5–15)
GFR SERPL CREATININE-BSD FRML MDRD: 51 ML/MIN/1.73
GLOBULIN UR ELPH-MCNC: 3.2 GM/DL
GLUCOSE BLD-MCNC: 106 MG/DL (ref 65–99)
HCT VFR BLD AUTO: 33.3 % (ref 34.1–44.9)
HDLC SERPL-MCNC: 45 MG/DL (ref 40–60)
HGB BLD-MCNC: 11.3 G/DL (ref 11.2–15.7)
LDLC SERPL CALC-MCNC: 138 MG/DL (ref 0–100)
LDLC/HDLC SERPL: 3.08 {RATIO}
LYMPHOCYTES # BLD AUTO: 2.14 10*3/MM3 (ref 0.8–7)
LYMPHOCYTES NFR BLD AUTO: 36.8 % (ref 10–60)
MAGNESIUM SERPL-MCNC: 1.8 MG/DL (ref 1.6–2.4)
MCH RBC QN AUTO: 28.5 PG (ref 26–34)
MCHC RBC AUTO-ENTMCNC: 33.9 G/DL (ref 31–37)
MCV RBC AUTO: 83.9 FL (ref 80–100)
MONOCYTES # BLD AUTO: 0.53 10*3/MM3 (ref 0–1)
MONOCYTES NFR BLD AUTO: 9.1 % (ref 0–13)
NEUTROPHILS # BLD AUTO: 3 10*3/MM3 (ref 1–11)
NEUTROPHILS NFR BLD AUTO: 51.7 % (ref 30–85)
PLATELET # BLD AUTO: 165 10*3/MM3 (ref 150–450)
PMV BLD AUTO: 10.4 FL (ref 6–12)
POTASSIUM BLD-SCNC: 4 MMOL/L (ref 3.5–5.2)
PROT SERPL-MCNC: 7.5 G/DL (ref 6–8.5)
RBC # BLD AUTO: 3.97 10*6/MM3 (ref 3.93–5.22)
SODIUM BLD-SCNC: 144 MMOL/L (ref 136–145)
TRIGL SERPL-MCNC: 118 MG/DL (ref 0–150)
TSH SERPL-ACNC: 3.63 MIU/ML (ref 0.27–4.2)
VLDLC SERPL-MCNC: 23.6 MG/DL (ref 5–40)
WBC NRBC COR # BLD: 5.81 10*3/MM3 (ref 5–10)

## 2018-03-14 PROCEDURE — 80061 LIPID PANEL: CPT | Performed by: NURSE PRACTITIONER

## 2018-03-14 PROCEDURE — 85025 COMPLETE CBC W/AUTO DIFF WBC: CPT | Performed by: NURSE PRACTITIONER

## 2018-03-14 PROCEDURE — 69209 REMOVE IMPACTED EAR WAX UNI: CPT | Performed by: NURSE PRACTITIONER

## 2018-03-14 PROCEDURE — 99214 OFFICE O/P EST MOD 30 MIN: CPT | Performed by: NURSE PRACTITIONER

## 2018-03-14 PROCEDURE — 80053 COMPREHEN METABOLIC PANEL: CPT | Performed by: NURSE PRACTITIONER

## 2018-03-14 NOTE — PROGRESS NOTES
Subjective   Tanya Greenwood is a 72 y.o. female.     She is here for 4 month follow up. She follows up with nephrologist and cardiologist routinely. She reports having left hand swelling that started about 1 week ago that is noticed in the morning. She reports some mild stiffness in morning. She also reports having some left leg cramps last week typically in the evening. She states the swelling resolves as the day progresses. She does exercise 3-4 times week (stationary bike). She checks her blood pressure once a week and readings less than 140/90. Her last eye exam July 2017. Her last dental exam several years ago.       Hypertension   This is a chronic problem. The current episode started more than 1 year ago. The problem is unchanged. The problem is controlled. Pertinent negatives include no anxiety, blurred vision, chest pain, headaches, orthopnea, palpitations, peripheral edema, PND or shortness of breath. Risk factors for coronary artery disease include sedentary lifestyle and dyslipidemia. Past treatments include diuretics, calcium channel blockers, angiotensin blockers and statins. The current treatment provides significant improvement. There are no compliance problems.  Hypertensive end-organ damage includes kidney disease and CAD/MI. Current antihypertension treatment includes diuretics, calcium channel blockers, angiotensin blockers and statins.   Hyperlipidemia   This is a chronic problem. The current episode started more than 1 year ago. The problem is controlled. Recent lipid tests were reviewed and are normal. Associated symptoms include leg pain. Pertinent negatives include no chest pain, myalgias or shortness of breath. Current antihyperlipidemic treatment includes statins and ezetimibe. The current treatment provides significant improvement of lipids. There are no compliance problems.  Risk factors for coronary artery disease include dyslipidemia, hypertension and post-menopausal.   Leg Pain     The incident occurred more than 1 week ago. There was no injury mechanism. Pain location: left leg cramping  The quality of the pain is described as cramping. The pain is at a severity of 0/10. The patient is experiencing no pain. The pain has been intermittent since onset. Pertinent negatives include no numbness or tingling. Nothing aggravates the symptoms. She has tried nothing for the symptoms.   Hand Pain    The incident occurred more than 1 week ago. The pain is present in the left hand. Quality: stiffness  The pain is at a severity of 0/10. The patient is experiencing no pain. The pain has been fluctuating since the incident. Pertinent negatives include no chest pain, numbness or tingling. Associated symptoms comments: Left hand swelling . She has tried elevation (ball hand  stretches ) for the symptoms. The treatment provided moderate relief.        The following portions of the patient's history were reviewed and updated as appropriate: allergies, current medications, past family history, past medical history, past social history, past surgical history and problem list.    Review of Systems   Constitutional: Negative for chills, fatigue and fever.   HENT: Positive for ear pain (left ear, but resolved ). Negative for ear discharge and facial swelling.    Eyes: Negative for blurred vision.   Respiratory: Negative for shortness of breath.    Cardiovascular: Negative for chest pain, palpitations, orthopnea, leg swelling and PND.   Musculoskeletal: Positive for arthralgias (left hand, with swelling ). Negative for myalgias.        Left leg cramps    Neurological: Positive for light-headedness (intermittent ). Negative for dizziness, tingling, numbness and headaches.       Objective   Physical Exam   Constitutional: She is oriented to person, place, and time. She appears well-developed and well-nourished.   HENT:   Head: Normocephalic.   Right Ear: No drainage or tenderness. Tympanic membrane is not  injected, not scarred, not perforated, not erythematous and not bulging. Tympanic membrane mobility is normal. No decreased hearing is noted.   Left Ear: No drainage or tenderness. Tympanic membrane is erythematous (mild ). Tympanic membrane is not injected, not scarred, not perforated and not bulging. Tympanic membrane mobility is normal.  No middle ear effusion. No decreased hearing is noted.   Nose: Nose normal. Right sinus exhibits no maxillary sinus tenderness and no frontal sinus tenderness. Left sinus exhibits no maxillary sinus tenderness and no frontal sinus tenderness.   Mouth/Throat: Uvula is midline and oropharynx is clear and moist.   Left ear cerumen impaction    Neck: Carotid bruit is not present. No thyroid mass and no thyromegaly present.   Cardiovascular: Regular rhythm and normal heart sounds.  Exam reveals no S3 and no S4.    No murmur heard.  Trace edema in left leg   Repeat bp left arm 138/82     Pulmonary/Chest: Effort normal and breath sounds normal. She has no decreased breath sounds. She has no wheezes. She has no rhonchi. She has no rales.   Musculoskeletal: She exhibits no edema.        Right hand: She exhibits normal range of motion, no tenderness, normal capillary refill and no swelling. Normal sensation noted. Normal strength noted.        Left hand: She exhibits swelling (trace, no redness or warmth noted ). She exhibits normal range of motion, no tenderness and normal capillary refill. Normal sensation noted. Normal strength noted.   Lymphadenopathy:        Head (right side): No submental, no submandibular, no tonsillar, no preauricular, no posterior auricular and no occipital adenopathy present.        Head (left side): No submental, no submandibular, no tonsillar, no preauricular, no posterior auricular and no occipital adenopathy present.   Neurological: She is alert and oriented to person, place, and time. Gait normal.   Skin: Skin is warm and dry.   Psychiatric: She has a normal  mood and affect.       Assessment/Plan   Tanya was seen today for hypertension, hyperlipidemia, leg pain and hand pain.    Diagnoses and all orders for this visit:    Essential hypertension  Comments:  stable   Orders:  -     CBC Auto Differential; Future  -     Comprehensive Metabolic Panel; Future  -     TSH; Future  -     CBC Auto Differential  -     Comprehensive Metabolic Panel  -     TSH    Hypercholesterolemia  Comments:  checking labs today   Orders:  -     Lipid Panel; Future  -     Lipid Panel    Chronic renal insufficiency, stage 2 (mild)  Comments:  stable; due to see nephrologist 3/27/2018    Screening for viral disease  -     Hepatitis C Antibody; Future  -     Hepatitis C Antibody    Leg cramps  Comments:  will check labs; recommended daily stretching   Orders:  -     Magnesium; Future  -     Magnesium    Swelling of left hand  Comments:  will monitor. she declines imaging today     Impacted cerumen of left ear  -     Ear Cerumen Removal Lavage      Ear Cerumen Removal Lavage  Date/Time: 3/14/2018 10:46 AM  Performed by: JULITA KAM  Authorized by: JULITA KAM   Consent: Verbal consent obtained.  Risks and benefits: risks, benefits and alternatives were discussed  Consent given by: patient    Anesthesia:  Local Anesthetic: none  Ceruminolytics applied: Ceruminolytics applied prior to the procedure.  Location details: left ear  Patient tolerance: Patient tolerated the procedure well with no immediate complications  Procedure type: irrigation       She was instructed to get shingrix immunization at local pharmacy.

## 2018-03-15 LAB — HCV AB S/CO SERPL IA: 0.1 S/CO RATIO (ref 0–0.9)

## 2018-05-22 ENCOUNTER — OFFICE VISIT (OUTPATIENT)
Dept: CARDIOLOGY | Facility: CLINIC | Age: 73
End: 2018-05-22

## 2018-05-22 VITALS
BODY MASS INDEX: 32.21 KG/M2 | SYSTOLIC BLOOD PRESSURE: 130 MMHG | DIASTOLIC BLOOD PRESSURE: 80 MMHG | WEIGHT: 200.4 LBS | HEART RATE: 58 BPM | HEIGHT: 66 IN

## 2018-05-22 DIAGNOSIS — I10 ESSENTIAL HYPERTENSION: ICD-10-CM

## 2018-05-22 DIAGNOSIS — N18.2 CHRONIC RENAL IMPAIRMENT, STAGE 2 (MILD): ICD-10-CM

## 2018-05-22 DIAGNOSIS — R00.2 PALPITATIONS: ICD-10-CM

## 2018-05-22 DIAGNOSIS — Z13.6 ENCOUNTER FOR SCREENING FOR VASCULAR DISEASE: ICD-10-CM

## 2018-05-22 DIAGNOSIS — I25.10 CORONARY ARTERY DISEASE INVOLVING NATIVE CORONARY ARTERY OF NATIVE HEART WITHOUT ANGINA PECTORIS: Primary | ICD-10-CM

## 2018-05-22 PROCEDURE — 99213 OFFICE O/P EST LOW 20 MIN: CPT | Performed by: INTERNAL MEDICINE

## 2018-05-22 PROCEDURE — 93000 ELECTROCARDIOGRAM COMPLETE: CPT | Performed by: INTERNAL MEDICINE

## 2018-05-22 RX ORDER — ATORVASTATIN CALCIUM 40 MG/1
TABLET, FILM COATED ORAL NIGHTLY
Refills: 1 | COMMUNITY
Start: 2018-04-16 | End: 2021-04-15 | Stop reason: SDUPTHER

## 2018-05-22 NOTE — PROGRESS NOTES
Date of Office Visit: 2018  Encounter Provider: Concepcion Wright MD  Place of Service: [facility] CARDIOLOGY  Patient Name: Tanya Greenwood  :1945    Chief complaint  Followup of coronary artery disease, pulmonary hypertension, hypertension    History of Present Illness  The patient is a 71-year-old female with history of hypertension, and  hyperlipidemia who in 2008 presented with exertional chest pain in  the setting of hypertension. An echocardiogram revealed mild pulmonary  hypertension with an RVSP of 39 mmHg with normal systolic function and no  significant valvular disease. Cardiac catheterization revealed a 50% ostial  right coronary artery stenosis with 20-30% stenosis of the left main,  proximal LAD and circumflex vessels. She was treated medically. She had a  followup echocardiogram that revealed normal pulmonary pressures in year  . She also had a followup PET scan that was negative for ischemia.  In 2016 she had a treadmill exercise stress test that was negative for ischemia.    Since last visit blood pressure has been slightly lower than it is today, though at times in the 130s to 140s systolic. She is exercising by walking 3 times a week as well as riding her bicycle. She had 1 episode of palpitations that lasted for 3 seconds since last visit. She otherwise denies any shortness of breath, syncope, near syncope, or chest pain. She feels well. She had a brief episode of lower extremity edema that occurred twice but it has subsequently spontaneously resolved. She is followed by Clarence Carvajal MD, for renal insufficiency and her creatinine is felt to be stable. She on a rare occasion uses Lasix 20 mg on a p.r.n. basis for edema.    Past Medical History:   Diagnosis Date   • Chronic renal insufficiency     stage 2 (mild)   • Coronary artery disease    • Coronary atherosclerosis    • Essential hypertension    • Hypercholesterolemia    •  Palpitations    • Pulmonary hypertension      Past Surgical History:   Procedure Laterality Date   • JOINT REPLACEMENT     • REPLACEMENT TOTAL KNEE Right 2013     Outpatient Medications Prior to Visit   Medication Sig Dispense Refill   • amLODIPine (NORVASC) 2.5 MG tablet TAKE 1 TABLET BY MOUTH DAILY 90 tablet 1   • aspirin 81 MG chewable tablet Chew 81 mg Daily.     • Cholecalciferol (VITAMIN D-3 PO) Take 2,000 Units by mouth Daily.     • ezetimibe (ZETIA) 10 MG tablet Take 1 tablet by mouth Daily. 30 tablet 5   • ferrous sulfate 325 (65 FE) MG tablet Take 65 mg by mouth Daily With Breakfast.     • furosemide (LASIX) 40 MG tablet Take 40 mg by mouth Daily. 1 po Monday, Wednesday, and Friday     • irbesartan (AVAPRO) 300 MG tablet Take 300 mg by mouth Daily. Unless over 140 then one QD  3   • metoprolol succinate XL (TOPROL-XL) 50 MG 24 hr tablet Take 1 tablet by mouth bid 30 tablet 3   • atorvastatin (LIPITOR) 20 MG tablet TK 1 T PO QHS  3   • doxepin (SINEquan) 10 MG capsule Take 1 capsule by mouth Every Night. Take approx 1 hour before bedtime. 30 capsule 3     No facility-administered medications prior to visit.        Allergies as of 05/22/2018 - Reviewed 05/22/2018   Allergen Reaction Noted   • Lipitor [atorvastatin] Other (See Comments) 05/22/2018     Social History     Social History   • Marital status: Single     Spouse name: N/A   • Number of children: N/A   • Years of education: N/A     Occupational History   • Not on file.     Social History Main Topics   • Smoking status: Former Smoker     Packs/day: 1.50     Years: 6.00     Types: Cigarettes     Quit date: 1978   • Smokeless tobacco: Never Used   • Alcohol use No      Comment: No caffeine use   • Drug use: No   • Sexual activity: Not on file     Other Topics Concern   • Not on file     Social History Narrative   • No narrative on file     Family History   Problem Relation Age of Onset   • Diabetes Mother    • Diabetes Father      Review of Systems  "  Constitution: Negative for fever, malaise/fatigue, weight gain and weight loss.   HENT: Negative for ear pain, hearing loss, nosebleeds and sore throat.    Eyes: Negative for double vision, pain, vision loss in left eye and vision loss in right eye.   Cardiovascular: Positive for leg swelling.        See history of present illness.   Respiratory: Positive for cough. Negative for shortness of breath, sleep disturbances due to breathing, snoring and wheezing.    Endocrine: Negative for cold intolerance, heat intolerance and polyuria.   Skin: Negative for itching, poor wound healing and rash.   Musculoskeletal: Negative for joint pain, joint swelling and myalgias.   Gastrointestinal: Negative for abdominal pain, diarrhea, hematochezia, nausea and vomiting.   Genitourinary: Negative for hematuria and hesitancy.   Neurological: Positive for excessive daytime sleepiness. Negative for numbness, paresthesias and seizures.   Psychiatric/Behavioral: Negative for depression. The patient is not nervous/anxious.         Objective:     Vitals:    05/22/18 1304   BP: 130/80   Pulse: 58   Weight: 90.9 kg (200 lb 6.4 oz)   Height: 167.6 cm (66\")     Body mass index is 32.35 kg/m².    Physical Exam   Constitutional: She is oriented to person, place, and time. She appears well-developed and well-nourished.   Obese   HENT:   Head: Normocephalic.   Nose: Nose normal.   Mouth/Throat: Oropharynx is clear and moist.   Eyes: Conjunctivae and EOM are normal. Pupils are equal, round, and reactive to light. Right eye exhibits no discharge. No scleral icterus.   Neck: Normal range of motion. Neck supple. No JVD present. No thyromegaly present.   Cardiovascular: Normal rate, regular rhythm, normal heart sounds and intact distal pulses.  Exam reveals no gallop and no friction rub.    No murmur heard.  Pulses:       Carotid pulses are 2+ on the right side, and 2+ on the left side.       Radial pulses are 2+ on the right side, and 2+ on the left " side.        Femoral pulses are 2+ on the right side, and 2+ on the left side.       Popliteal pulses are 2+ on the right side, and 2+ on the left side.        Dorsalis pedis pulses are 2+ on the right side, and 2+ on the left side.        Posterior tibial pulses are 2+ on the right side, and 2+ on the left side.   Pulmonary/Chest: Effort normal and breath sounds normal. No respiratory distress. She has no wheezes. She has no rales.   Abdominal: Soft. Bowel sounds are normal. She exhibits no distension. There is no hepatosplenomegaly. There is no tenderness. There is no rebound.   Musculoskeletal: Normal range of motion. She exhibits no edema or tenderness.   Neurological: She is alert and oriented to person, place, and time.   Skin: Skin is warm and dry. No rash noted. No erythema.   Psychiatric: She has a normal mood and affect. Her behavior is normal. Judgment and thought content normal.   Vitals reviewed.    Lab Review:     ECG 12 Lead  Date/Time: 5/22/2018 1:05 PM  Performed by: MARVIN HAM  Authorized by: MARVIN HAM   Comparison: compared with previous ECG   Similar to previous ECG  Rhythm: sinus rhythm  Conduction comments: Nonspecific ST T wave changes            Assessment:       Diagnosis Plan   1. Coronary artery disease involving native coronary artery of native heart without angina pectoris  ECG 12 Lead   2. Palpitations  ECG 12 Lead   3. Encounter for screening for vascular disease  Vascular Screening   4. Essential hypertension     5. Chronic renal impairment, stage 2 (mild)       Plan:       1.  Coronary artery disease, modest with no anginal symptoms. Constinue medical therapy  2.  Hypertension. She will increase her exercise regimen  3.  Dyslipidemia.  4.  History of transient pulmonary hypertension   5.  Renal insufficiency, felt to be stable per pt  6.  Asymptomatic bradycardia. Will continue to monitor at home.  7.  Elevated glucose, low carb diet again recommended  8.  Vascular screening. She  will sign up for this.    Coronary Artery Disease  Assessment  • The patient has no angina    Plan  • Lifestyle modifications discussed include adhering to a heart healthy diet, maintenance of a healthy weight, regular exercise and regular monitoring of cholesterol and blood pressure    Subjective - Objective  • Current antiplatelet therapy includes aspirin 81 mg       Tanya Greenwood   Home Medication Instructions ADRIANNA:    Printed on:05/22/18 1504   Medication Information                      amLODIPine (NORVASC) 2.5 MG tablet  TAKE 1 TABLET BY MOUTH DAILY             aspirin 81 MG chewable tablet  Chew 81 mg Daily.             atorvastatin (LIPITOR) 40 MG tablet  Take  by mouth Every Night.             Cholecalciferol (VITAMIN D-3 PO)  Take 2,000 Units by mouth Daily.             ezetimibe (ZETIA) 10 MG tablet  Take 1 tablet by mouth Daily.             ferrous sulfate 325 (65 FE) MG tablet  Take 65 mg by mouth Daily With Breakfast.             furosemide (LASIX) 40 MG tablet  Take 40 mg by mouth Daily. 1 po Monday, Wednesday, and Friday             irbesartan (AVAPRO) 300 MG tablet  Take 300 mg by mouth Daily. Unless over 140 then one QD             metoprolol succinate XL (TOPROL-XL) 50 MG 24 hr tablet  Take 1 tablet by mouth bid                 Dictated utilizing Dragon dictation

## 2018-06-11 RX ORDER — METOPROLOL SUCCINATE 50 MG/1
TABLET, EXTENDED RELEASE ORAL
Qty: 30 TABLET | Refills: 0 | Status: SHIPPED | OUTPATIENT
Start: 2018-06-11 | End: 2018-07-09 | Stop reason: SDUPTHER

## 2018-06-19 DIAGNOSIS — I10 ESSENTIAL HYPERTENSION: ICD-10-CM

## 2018-06-20 RX ORDER — AMLODIPINE BESYLATE 2.5 MG/1
2.5 TABLET ORAL DAILY
Qty: 90 TABLET | Refills: 1 | Status: SHIPPED | OUTPATIENT
Start: 2018-06-20 | End: 2018-10-01 | Stop reason: SDUPTHER

## 2018-06-20 RX ORDER — AMLODIPINE BESYLATE 2.5 MG/1
2.5 TABLET ORAL DAILY
Qty: 90 TABLET | Refills: 1 | Status: SHIPPED | OUTPATIENT
Start: 2018-06-20 | End: 2019-10-03 | Stop reason: SDUPTHER

## 2018-06-20 RX ORDER — EZETIMIBE 10 MG/1
10 TABLET ORAL DAILY
Qty: 90 TABLET | Refills: 1 | Status: SHIPPED | OUTPATIENT
Start: 2018-06-20 | End: 2019-08-04 | Stop reason: SDUPTHER

## 2018-07-09 RX ORDER — METOPROLOL SUCCINATE 50 MG/1
TABLET, EXTENDED RELEASE ORAL
Qty: 30 TABLET | Refills: 0 | Status: SHIPPED | OUTPATIENT
Start: 2018-07-09 | End: 2018-08-14 | Stop reason: SDUPTHER

## 2018-07-17 DIAGNOSIS — Z12.31 ENCOUNTER FOR SCREENING MAMMOGRAM FOR BREAST CANCER: Primary | ICD-10-CM

## 2018-07-19 ENCOUNTER — OFFICE VISIT (OUTPATIENT)
Dept: INTERNAL MEDICINE | Facility: CLINIC | Age: 73
End: 2018-07-19

## 2018-07-19 ENCOUNTER — APPOINTMENT (OUTPATIENT)
Dept: WOMENS IMAGING | Facility: HOSPITAL | Age: 73
End: 2018-07-19

## 2018-07-19 DIAGNOSIS — Z12.31 ENCOUNTER FOR SCREENING MAMMOGRAM FOR BREAST CANCER: ICD-10-CM

## 2018-07-19 PROCEDURE — 77067 SCR MAMMO BI INCL CAD: CPT | Performed by: RADIOLOGY

## 2018-07-19 PROCEDURE — 77067 SCR MAMMO BI INCL CAD: CPT | Performed by: INTERNAL MEDICINE

## 2018-08-14 RX ORDER — METOPROLOL SUCCINATE 50 MG/1
TABLET, EXTENDED RELEASE ORAL
Qty: 30 TABLET | Refills: 2 | Status: SHIPPED | OUTPATIENT
Start: 2018-08-14 | End: 2018-11-05 | Stop reason: SDUPTHER

## 2018-09-16 ENCOUNTER — HOSPITAL ENCOUNTER (OUTPATIENT)
Dept: CARDIOLOGY | Facility: HOSPITAL | Age: 73
Discharge: HOME | End: 2018-09-16
Attending: INTERNAL MEDICINE

## 2018-09-16 DIAGNOSIS — Z13.6 ENCOUNTER FOR SCREENING FOR VASCULAR DISEASE: ICD-10-CM

## 2018-09-17 ENCOUNTER — TELEPHONE (OUTPATIENT)
Dept: CARDIOLOGY | Facility: CLINIC | Age: 73
End: 2018-09-17

## 2018-09-17 LAB
BH CV XLRA MEAS - PAD LEFT ABI PT: 1.14
BH CV XLRA MEAS - PAD LEFT ARM: 148 MMHG
BH CV XLRA MEAS - PAD LEFT LEG PT: 169 MMHG
BH CV XLRA MEAS - PAD RIGHT ABI PT: 1.05
BH CV XLRA MEAS - PAD RIGHT ARM: 134 MMHG
BH CV XLRA MEAS - PAD RIGHT LEG PT: 155 MMHG
BH CV XLRA MEAS - PROX AO DIAM: 1.6 CM
BH CV XLRA MEAS LEFT ICA/CCA RATIO: 0.95
BH CV XLRA MEAS LEFT MID CCA PSV: NORMAL CM/SEC
BH CV XLRA MEAS LEFT MID ICA PSV: NORMAL CM/SEC
BH CV XLRA MEAS LEFT PROX ECA PSV: 43 CM/SEC
BH CV XLRA MEAS RIGHT ICA/CCA RATIO: 0.74
BH CV XLRA MEAS RIGHT MID CCA PSV: NORMAL CM/SEC
BH CV XLRA MEAS RIGHT MID ICA PSV: NORMAL CM/SEC
BH CV XLRA MEAS RIGHT PROX ECA PSV: 52 CM/SEC

## 2018-09-17 RX ORDER — EZETIMIBE 10 MG/1
10 TABLET ORAL DAILY
Qty: 30 TABLET | Refills: 0 | Status: SHIPPED | OUTPATIENT
Start: 2018-09-17 | End: 2018-10-23 | Stop reason: SDUPTHER

## 2018-10-01 ENCOUNTER — OFFICE VISIT (OUTPATIENT)
Dept: INTERNAL MEDICINE | Facility: CLINIC | Age: 73
End: 2018-10-01

## 2018-10-01 VITALS
DIASTOLIC BLOOD PRESSURE: 78 MMHG | SYSTOLIC BLOOD PRESSURE: 128 MMHG | WEIGHT: 201 LBS | HEIGHT: 66 IN | BODY MASS INDEX: 32.3 KG/M2

## 2018-10-01 DIAGNOSIS — E78.00 HYPERCHOLESTEROLEMIA: Chronic | ICD-10-CM

## 2018-10-01 DIAGNOSIS — I25.10 CORONARY ARTERY DISEASE INVOLVING NATIVE CORONARY ARTERY OF NATIVE HEART WITHOUT ANGINA PECTORIS: Chronic | ICD-10-CM

## 2018-10-01 DIAGNOSIS — N18.2 CHRONIC RENAL IMPAIRMENT, STAGE 2 (MILD): Chronic | ICD-10-CM

## 2018-10-01 DIAGNOSIS — I10 ESSENTIAL HYPERTENSION: Primary | Chronic | ICD-10-CM

## 2018-10-01 LAB
ALBUMIN SERPL-MCNC: 4.5 G/DL (ref 3.5–5.2)
ALBUMIN/GLOB SERPL: 1.3 G/DL
ALP SERPL-CCNC: 77 U/L (ref 39–117)
ALT SERPL W P-5'-P-CCNC: 23 U/L (ref 1–33)
ANION GAP SERPL CALCULATED.3IONS-SCNC: 14 MMOL/L
AST SERPL-CCNC: 21 U/L (ref 1–32)
BILIRUB SERPL-MCNC: 0.7 MG/DL (ref 0.1–1.2)
BUN BLD-MCNC: 15 MG/DL (ref 8–23)
BUN/CREAT SERPL: 11.8 (ref 7–25)
CALCIUM SPEC-SCNC: 9.4 MG/DL (ref 8.6–10.5)
CHLORIDE SERPL-SCNC: 107 MMOL/L (ref 98–107)
CHOLEST SERPL-MCNC: 153 MG/DL (ref 0–200)
CO2 SERPL-SCNC: 23 MMOL/L (ref 22–29)
CREAT BLD-MCNC: 1.27 MG/DL (ref 0.57–1)
GFR SERPL CREATININE-BSD FRML MDRD: 50 ML/MIN/1.73
GLOBULIN UR ELPH-MCNC: 3.4 GM/DL
GLUCOSE BLD-MCNC: 95 MG/DL (ref 65–99)
HDLC SERPL-MCNC: 48 MG/DL (ref 40–60)
LDLC SERPL CALC-MCNC: 88 MG/DL (ref 0–100)
LDLC/HDLC SERPL: 1.83 {RATIO}
POTASSIUM BLD-SCNC: 3.8 MMOL/L (ref 3.5–5.2)
PROT SERPL-MCNC: 7.9 G/DL (ref 6–8.5)
SODIUM BLD-SCNC: 144 MMOL/L (ref 136–145)
TRIGL SERPL-MCNC: 85 MG/DL (ref 0–150)
TSH SERPL DL<=0.05 MIU/L-ACNC: 3.46 MIU/ML (ref 0.27–4.2)
VLDLC SERPL-MCNC: 17 MG/DL (ref 5–40)

## 2018-10-01 PROCEDURE — 36415 COLL VENOUS BLD VENIPUNCTURE: CPT | Performed by: INTERNAL MEDICINE

## 2018-10-01 PROCEDURE — 80061 LIPID PANEL: CPT | Performed by: INTERNAL MEDICINE

## 2018-10-01 PROCEDURE — 84443 ASSAY THYROID STIM HORMONE: CPT | Performed by: INTERNAL MEDICINE

## 2018-10-01 PROCEDURE — 99214 OFFICE O/P EST MOD 30 MIN: CPT | Performed by: INTERNAL MEDICINE

## 2018-10-01 PROCEDURE — 80053 COMPREHEN METABOLIC PANEL: CPT | Performed by: INTERNAL MEDICINE

## 2018-10-01 RX ORDER — SULINDAC 200 MG/1
TABLET ORAL
Refills: 0 | COMMUNITY
Start: 2018-09-14 | End: 2019-10-03

## 2018-10-01 NOTE — PROGRESS NOTES
Subjective   Tanya Greenwood is a 73 y.o. female. Presents with a chief complaint of HTN, Hyperlipidemia, CKDII, CAD, here for follow up. Doing well overall.    History of Present Illness     The following portions of the patient's history were reviewed and updated as appropriate: allergies, current medications, past family history, past medical history, past social history, past surgical history and problem list.    Review of Systems   Constitutional: Negative.    HENT: Negative.    Eyes: Negative.    Respiratory: Negative.    Cardiovascular: Negative.    Gastrointestinal: Negative.    Endocrine: Negative.    Genitourinary: Negative.    Musculoskeletal: Positive for arthralgias.   Skin: Negative.    Allergic/Immunologic: Negative.    Neurological: Negative.    Hematological: Negative.    Psychiatric/Behavioral: Negative.        Objective   Physical Exam   Constitutional: She is oriented to person, place, and time. She appears well-developed and well-nourished.   HENT:   Head: Normocephalic and atraumatic.   Eyes: Pupils are equal, round, and reactive to light. EOM are normal.   Neck: Normal range of motion. Neck supple.   Cardiovascular: Normal rate, regular rhythm and normal heart sounds.    Pulmonary/Chest: Effort normal and breath sounds normal.   Abdominal: Soft. Bowel sounds are normal.   Musculoskeletal:   Mild low back pain   Neurological: She is alert and oriented to person, place, and time.   Skin: Skin is warm and dry.   Psychiatric: She has a normal mood and affect. Her behavior is normal. Judgment and thought content normal.   Nursing note and vitals reviewed.        Assessment/Plan   Tanya was seen today for hypertension and hyperlipidemia.    Diagnoses and all orders for this visit:    Essential hypertension  Comments:  well controlled  Orders:  -     Comprehensive Metabolic Panel; Future  -     TSH; Future    Hypercholesterolemia  Comments:  needs lipids checked  Orders:  -     Lipid Panel;  Future    Coronary artery disease involving native coronary artery of native heart without angina pectoris  Comments:  just had a stress test, did well     Chronic renal impairment, stage 2 (mild)  Comments:  working with a Nephrologist

## 2018-10-23 RX ORDER — EZETIMIBE 10 MG/1
10 TABLET ORAL DAILY
Qty: 90 TABLET | Refills: 1 | Status: SHIPPED | OUTPATIENT
Start: 2018-10-23 | End: 2019-04-10 | Stop reason: SDUPTHER

## 2018-11-05 RX ORDER — METOPROLOL SUCCINATE 50 MG/1
TABLET, EXTENDED RELEASE ORAL
Qty: 30 TABLET | Refills: 5 | Status: SHIPPED | OUTPATIENT
Start: 2018-11-05 | End: 2019-04-29 | Stop reason: SDUPTHER

## 2018-12-19 DIAGNOSIS — I10 ESSENTIAL HYPERTENSION: ICD-10-CM

## 2018-12-19 RX ORDER — AMLODIPINE BESYLATE 2.5 MG/1
2.5 TABLET ORAL DAILY
Qty: 90 TABLET | Refills: 1 | Status: SHIPPED | OUTPATIENT
Start: 2018-12-19 | End: 2019-04-10 | Stop reason: SDUPTHER

## 2019-04-09 ENCOUNTER — TELEPHONE (OUTPATIENT)
Dept: INTERNAL MEDICINE | Facility: CLINIC | Age: 74
End: 2019-04-09

## 2019-04-09 NOTE — TELEPHONE ENCOUNTER
----- Message from Donna Lind sent at 4/9/2019  7:41 AM EDT -----  Contact: Patient  When I called patient to reschedule, she complained with a cough x1 week, nonproductive.  Describes it as dry and hacky.  States cannot sleep at night.  Has tried Robitussin.  Inquiring if something can be called in. States she has seen Gely in the past.  Please advise.    Patient:  [phone]    [facility] – [place] [address] - [address] AT [address] [place] [address] - [phone] [place] [phone] FX

## 2019-04-10 ENCOUNTER — OFFICE VISIT (OUTPATIENT)
Dept: INTERNAL MEDICINE | Facility: CLINIC | Age: 74
End: 2019-04-10

## 2019-04-10 VITALS
HEART RATE: 98 BPM | DIASTOLIC BLOOD PRESSURE: 80 MMHG | BODY MASS INDEX: 32.3 KG/M2 | HEIGHT: 66 IN | SYSTOLIC BLOOD PRESSURE: 122 MMHG | RESPIRATION RATE: 18 BRPM | OXYGEN SATURATION: 98 % | WEIGHT: 201 LBS | TEMPERATURE: 98.3 F

## 2019-04-10 DIAGNOSIS — J06.9 ACUTE URI: ICD-10-CM

## 2019-04-10 DIAGNOSIS — R05.9 COUGH: Primary | ICD-10-CM

## 2019-04-10 PROCEDURE — 99213 OFFICE O/P EST LOW 20 MIN: CPT | Performed by: NURSE PRACTITIONER

## 2019-04-10 RX ORDER — BENZONATATE 200 MG/1
200 CAPSULE ORAL 3 TIMES DAILY PRN
Qty: 30 CAPSULE | Refills: 0 | Status: SHIPPED | OUTPATIENT
Start: 2019-04-10 | End: 2019-10-03

## 2019-04-10 RX ORDER — LEVOCETIRIZINE DIHYDROCHLORIDE 5 MG/1
5 TABLET, FILM COATED ORAL EVERY EVENING
Qty: 30 TABLET | Refills: 0 | Status: SHIPPED | OUTPATIENT
Start: 2019-04-10 | End: 2019-04-10 | Stop reason: SDUPTHER

## 2019-04-10 RX ORDER — LEVOCETIRIZINE DIHYDROCHLORIDE 5 MG/1
5 TABLET, FILM COATED ORAL EVERY EVENING
Qty: 90 TABLET | Refills: 0 | Status: SHIPPED | OUTPATIENT
Start: 2019-04-10 | End: 2019-10-03

## 2019-04-10 RX ORDER — AMOXICILLIN 875 MG/1
875 TABLET, COATED ORAL 2 TIMES DAILY
Qty: 14 TABLET | Refills: 0 | Status: SHIPPED | OUTPATIENT
Start: 2019-04-10 | End: 2019-04-18

## 2019-04-10 RX ORDER — GUAIFENESIN 600 MG/1
600 TABLET, EXTENDED RELEASE ORAL 2 TIMES DAILY
Qty: 14 TABLET | Refills: 0
Start: 2019-04-10 | End: 2019-04-17

## 2019-04-10 NOTE — PROGRESS NOTES
Sahara Greenwood is a 73 y.o. female.     No recent air travel. No known ID contact. She did not receive her flu shot.       URI    This is a new problem. The current episode started 1 to 4 weeks ago. The problem has been gradually worsening. There has been no fever. Associated symptoms include congestion, coughing, rhinorrhea and wheezing (?). Pertinent negatives include no abdominal pain, chest pain, diarrhea, ear pain, headaches, nausea, plugged ear sensation, sinus pain, sneezing or sore throat. Treatments tried: robitussin  The treatment provided no relief.        The following portions of the patient's history were reviewed and updated as appropriate: allergies, current medications, past social history and problem list.    Review of Systems   Constitutional: Negative for appetite change, chills, fatigue and fever.   HENT: Positive for congestion and rhinorrhea. Negative for ear discharge, ear pain, facial swelling, hearing loss, postnasal drip, sinus pressure, sinus pain, sneezing, sore throat and tinnitus.    Respiratory: Positive for cough and wheezing (?). Negative for chest tightness and shortness of breath.    Cardiovascular: Negative for chest pain and palpitations.   Gastrointestinal: Negative for abdominal pain, diarrhea and nausea.   Musculoskeletal: Negative for myalgias.   Allergic/Immunologic: Negative for environmental allergies.   Neurological: Negative for headaches.   Hematological: Negative for adenopathy.       Objective   Physical Exam   Constitutional: She appears well-developed and well-nourished. She is cooperative. She does not have a sickly appearance. She does not appear ill.   HENT:   Head: Normocephalic.   Right Ear: Hearing, tympanic membrane and external ear normal. No drainage, swelling or tenderness. No mastoid tenderness. Tympanic membrane is not injected, not scarred, not erythematous and not bulging. Tympanic membrane mobility is normal. No middle ear effusion. No  decreased hearing is noted.   Left Ear: Hearing and external ear normal. No drainage, swelling or tenderness. No mastoid tenderness. Tympanic membrane is not injected, not scarred, not erythematous and not bulging. Tympanic membrane mobility is normal.  No middle ear effusion. No decreased hearing is noted.   Nose: Nose normal. No mucosal edema, rhinorrhea, sinus tenderness or nasal deformity. Right sinus exhibits no maxillary sinus tenderness and no frontal sinus tenderness. Left sinus exhibits no maxillary sinus tenderness and no frontal sinus tenderness.   Mouth/Throat: Mucous membranes are normal. Normal dentition. Posterior oropharyngeal erythema (clear hypersecretions ) present. No tonsillar exudate.   Eyes: Conjunctivae and lids are normal. Pupils are equal, round, and reactive to light. Right eye exhibits no discharge and no exudate. Left eye exhibits no discharge and no exudate.   Neck: Trachea normal and normal range of motion. No edema present. No thyroid mass and no thyromegaly present.   Cardiovascular: Regular rhythm, normal heart sounds and normal pulses.   No murmur heard.  Pulmonary/Chest: Breath sounds normal. No respiratory distress. She has no decreased breath sounds. She has no wheezes. She has no rhonchi. She has no rales.   Dry cough    Lymphadenopathy:        Head (right side): No submental, no submandibular, no tonsillar, no preauricular, no posterior auricular and no occipital adenopathy present.        Head (left side): No submental, no submandibular, no tonsillar, no preauricular, no posterior auricular and no occipital adenopathy present.     She has no cervical adenopathy.   Neurological: She is alert.   Skin: Skin is warm, dry and intact. No cyanosis. Nails show no clubbing.       Assessment/Plan   Tanya was seen today for cough and uri.    Diagnoses and all orders for this visit:    Cough  -     benzonatate (TESSALON) 200 MG capsule; Take 1 capsule by mouth 3 (Three) Times a Day As  Needed for Cough.    Acute URI  Comments:  lots of water   Orders:  -     guaiFENesin (MUCINEX) 600 MG 12 hr tablet; Take 1 tablet by mouth 2 (Two) Times a Day for 7 days.  -     levocetirizine (XYZAL) 5 MG tablet; Take 1 tablet by mouth Every Evening.  -     amoxicillin (AMOXIL) 875 MG tablet; Take 1 tablet by mouth 2 (Two) Times a Day for 7 days.    She will return if worsening of symptoms. She will wait 2-3 days and start antibiotic if needed.

## 2019-04-10 NOTE — PATIENT INSTRUCTIONS
"Upper Respiratory Infection, Adult  An upper respiratory infection (URI) affects the nose, throat, and upper air passages. URIs are caused by germs (viruses). The most common type of URI is often called \"the common cold.\"  Medicines cannot cure URIs, but you can do things at home to relieve your symptoms. URIs usually get better within 7-10 days.  Follow these instructions at home:  Activity  · Rest as needed.  · If you have a fever, stay home from work or school until your fever is gone, or until your doctor says you may return to work or school.  ? You should stay home until you cannot spread the infection anymore (you are not contagious).  ? Your doctor may have you wear a face mask so you have less risk of spreading the infection.  Relieving symptoms  · Gargle with a salt-water mixture 3-4 times a day or as needed. To make a salt-water mixture, completely dissolve ½-1 tsp of salt in 1 cup of warm water.  · Use a cool-mist humidifier to add moisture to the air. This can help you breathe more easily.  Eating and drinking  · Drink enough fluid to keep your pee (urine) pale yellow.  · Eat soups and other clear broths.  General instructions  · Take over-the-counter and prescription medicines only as told by your doctor. These include cold medicines, fever reducers, and cough suppressants.  · Do not use any products that contain nicotine or tobacco. These include cigarettes and e-cigarettes. If you need help quitting, ask your doctor.  · Avoid being where people are smoking (avoid secondhand smoke).  · Make sure you get regular shots and get the flu shot every year.  · Keep all follow-up visits as told by your doctor. This is important.  How to avoid spreading infection to others  · Wash your hands often with soap and water. If you do not have soap and water, use hand .  · Avoid touching your mouth, face, eyes, or nose.  · Cough or sneeze into a tissue or your sleeve or elbow. Do not cough or sneeze into your " "hand or into the air.  Contact a doctor if:  · You are getting worse, not better.  · You have any of these:  ? A fever.  ? Chills.  ? Brown or red mucus in your nose.  ? Yellow or brown fluid (discharge)coming from your nose.  ? Pain in your face, especially when you bend forward.  ? Swollen neck glands.  ? Pain with swallowing.  ? White areas in the back of your throat.  Get help right away if:  · You have shortness of breath that gets worse.  · You have very bad or constant:  ? Headache.  ? Ear pain.  ? Pain in your forehead, behind your eyes, and over your cheekbones (sinus pain).  ? Chest pain.  · You have long-lasting (chronic) lung disease along with any of these:  ? Wheezing.  ? Long-lasting cough.  ? Coughing up blood.  ? A change in your usual mucus.  · You have a stiff neck.  · You have changes in your:  ? Vision.  ? Hearing.  ? Thinking.  ? Mood.  Summary  · An upper respiratory infection (URI) is caused by a germ called a virus. The most common type of URI is often called \"the common cold.\"  · URIs usually get better within 7-10 days.  · Take over-the-counter and prescription medicines only as told by your doctor.  This information is not intended to replace advice given to you by your health care provider. Make sure you discuss any questions you have with your health care provider.  Document Released: 06/05/2009 Document Revised: 08/10/2018 Document Reviewed: 08/10/2018  [employer] Interactive Patient Education © 2019 [employer]    "

## 2019-04-18 ENCOUNTER — OFFICE VISIT (OUTPATIENT)
Dept: INTERNAL MEDICINE | Facility: CLINIC | Age: 74
End: 2019-04-18

## 2019-04-18 VITALS
BODY MASS INDEX: 31.82 KG/M2 | SYSTOLIC BLOOD PRESSURE: 128 MMHG | WEIGHT: 198 LBS | HEIGHT: 66 IN | DIASTOLIC BLOOD PRESSURE: 80 MMHG

## 2019-04-18 DIAGNOSIS — E78.00 HYPERCHOLESTEROLEMIA: Chronic | ICD-10-CM

## 2019-04-18 DIAGNOSIS — I25.10 CORONARY ARTERY DISEASE INVOLVING NATIVE CORONARY ARTERY OF NATIVE HEART WITHOUT ANGINA PECTORIS: Chronic | ICD-10-CM

## 2019-04-18 DIAGNOSIS — I27.20 PULMONARY HTN (HCC): ICD-10-CM

## 2019-04-18 DIAGNOSIS — N18.2 CHRONIC RENAL IMPAIRMENT, STAGE 2 (MILD): ICD-10-CM

## 2019-04-18 DIAGNOSIS — I10 ESSENTIAL HYPERTENSION: Primary | Chronic | ICD-10-CM

## 2019-04-18 LAB
ALBUMIN SERPL-MCNC: 4.6 G/DL (ref 3.5–5.2)
ALBUMIN/GLOB SERPL: 1.4 G/DL
ALP SERPL-CCNC: 71 U/L (ref 39–117)
ALT SERPL W P-5'-P-CCNC: 18 U/L (ref 1–33)
ANION GAP SERPL CALCULATED.3IONS-SCNC: 11.8 MMOL/L
AST SERPL-CCNC: 22 U/L (ref 1–32)
BILIRUB SERPL-MCNC: 1.3 MG/DL (ref 0.2–1.2)
BUN BLD-MCNC: 13 MG/DL (ref 8–23)
BUN/CREAT SERPL: 10.2 (ref 7–25)
CALCIUM SPEC-SCNC: 10.3 MG/DL (ref 8.6–10.5)
CHLORIDE SERPL-SCNC: 104 MMOL/L (ref 98–107)
CHOLEST SERPL-MCNC: 169 MG/DL (ref 0–200)
CO2 SERPL-SCNC: 26.2 MMOL/L (ref 22–29)
CREAT BLD-MCNC: 1.27 MG/DL (ref 0.57–1)
GFR SERPL CREATININE-BSD FRML MDRD: 50 ML/MIN/1.73
GLOBULIN UR ELPH-MCNC: 3.2 GM/DL
GLUCOSE BLD-MCNC: 109 MG/DL (ref 65–99)
HDLC SERPL-MCNC: 44 MG/DL (ref 40–60)
LDLC SERPL CALC-MCNC: 105 MG/DL (ref 0–100)
LDLC/HDLC SERPL: 2.38 {RATIO}
POTASSIUM BLD-SCNC: 3.8 MMOL/L (ref 3.5–5.2)
PROT SERPL-MCNC: 7.8 G/DL (ref 6–8.5)
SODIUM BLD-SCNC: 142 MMOL/L (ref 136–145)
TRIGL SERPL-MCNC: 101 MG/DL (ref 0–150)
VLDLC SERPL-MCNC: 20.2 MG/DL (ref 5–40)

## 2019-04-18 PROCEDURE — 99214 OFFICE O/P EST MOD 30 MIN: CPT | Performed by: INTERNAL MEDICINE

## 2019-04-18 PROCEDURE — 80053 COMPREHEN METABOLIC PANEL: CPT | Performed by: INTERNAL MEDICINE

## 2019-04-18 PROCEDURE — 36415 COLL VENOUS BLD VENIPUNCTURE: CPT | Performed by: INTERNAL MEDICINE

## 2019-04-18 PROCEDURE — 80061 LIPID PANEL: CPT | Performed by: INTERNAL MEDICINE

## 2019-04-18 NOTE — PROGRESS NOTES
Subjective   Tanya Greenwood is a 73 y.o. female.  Presents with a chief complaint of hyperlipidemia that is well controlled, hypertension that is well controlled well-controlled coronary artery disease and chronic renal insufficiency.  She also has a diagnosis of primary pulmonary hypertension that is stable.  She recently saw 1 of our nurse practitioners who placed her on medication for allergies and a sinus infection which worked brilliantly for her and she is doing much better.  She recently saw her nephrologist and was told that her kidney function is excellent.  Does have a strong family history of diabetes and in addition to checking her lipid profile today I am going to check a fasting blood sugar.  No other changes to make at this time and I will see the patient back in 6 months.    History of Present Illness patient is here for follow-up regarding her allergies and her general medical issues    The following portions of the patient's history were reviewed and updated as appropriate: allergies, current medications, past family history, past medical history, past social history, past surgical history and problem list.    Review of Systems   Constitutional: Negative.    HENT: Positive for congestion.    Eyes: Negative.    Respiratory: Negative.    Cardiovascular: Negative.    Gastrointestinal: Negative.    Endocrine: Negative.    Genitourinary: Negative.    Musculoskeletal: Positive for arthralgias.   Skin: Negative.    Allergic/Immunologic: Negative.    Neurological: Negative.    Hematological: Negative.    Psychiatric/Behavioral: Negative.        Objective   Physical Exam   Constitutional: She appears well-developed and well-nourished.   HENT:   Head: Normocephalic and atraumatic.   Eyes: EOM are normal. Pupils are equal, round, and reactive to light.   Neck: Normal range of motion.   Cardiovascular: Normal rate, regular rhythm and normal heart sounds.   Pulmonary/Chest: Effort normal and breath sounds  normal.   Abdominal: Soft. Bowel sounds are normal.   Musculoskeletal: Normal range of motion.   Neurological: She is alert.   Skin: Skin is warm.   Nursing note and vitals reviewed.        Assessment/Plan   Tanya was seen today for hypertension and cough.    Diagnoses and all orders for this visit:    Essential hypertension  Comments:  well controlled    Coronary artery disease involving native coronary artery of native heart without angina pectoris  Comments:  very stable    Chronic renal impairment, stage 2 (mild)  Comments:  stable for now    Hypercholesterolemia  Comments:  check a lipid profile  Orders:  -     Comprehensive metabolic panel; Future  -     Lipid Panel With LDL/HDL Ratio; Future    Pulmonary HTN (CMS/HCC)  Comments:  stable for now

## 2019-04-29 RX ORDER — METOPROLOL SUCCINATE 50 MG/1
TABLET, EXTENDED RELEASE ORAL
Qty: 30 TABLET | Refills: 0 | Status: SHIPPED | OUTPATIENT
Start: 2019-04-29 | End: 2019-04-29 | Stop reason: SDUPTHER

## 2019-04-29 RX ORDER — METOPROLOL SUCCINATE 50 MG/1
TABLET, EXTENDED RELEASE ORAL
Qty: 180 TABLET | Refills: 1 | Status: SHIPPED | OUTPATIENT
Start: 2019-04-29 | End: 2019-10-26 | Stop reason: SDUPTHER

## 2019-05-06 RX ORDER — EZETIMIBE 10 MG/1
10 TABLET ORAL DAILY
Qty: 90 TABLET | Refills: 0 | Status: SHIPPED | OUTPATIENT
Start: 2019-05-06 | End: 2019-10-03 | Stop reason: SDUPTHER

## 2019-08-05 RX ORDER — EZETIMIBE 10 MG/1
10 TABLET ORAL DAILY
Qty: 90 TABLET | Refills: 0 | Status: SHIPPED | OUTPATIENT
Start: 2019-08-05 | End: 2019-10-22

## 2019-10-03 ENCOUNTER — OFFICE VISIT (OUTPATIENT)
Dept: CARDIOLOGY | Facility: CLINIC | Age: 74
End: 2019-10-03

## 2019-10-03 VITALS
WEIGHT: 196.8 LBS | HEIGHT: 66 IN | HEART RATE: 54 BPM | BODY MASS INDEX: 31.63 KG/M2 | DIASTOLIC BLOOD PRESSURE: 80 MMHG | SYSTOLIC BLOOD PRESSURE: 130 MMHG

## 2019-10-03 DIAGNOSIS — I25.10 CORONARY ARTERY DISEASE INVOLVING NATIVE CORONARY ARTERY OF NATIVE HEART WITHOUT ANGINA PECTORIS: Primary | ICD-10-CM

## 2019-10-03 DIAGNOSIS — N18.2 CHRONIC RENAL IMPAIRMENT, STAGE 2 (MILD): ICD-10-CM

## 2019-10-03 DIAGNOSIS — I10 ESSENTIAL HYPERTENSION: ICD-10-CM

## 2019-10-03 DIAGNOSIS — E78.00 HYPERCHOLESTEROLEMIA: ICD-10-CM

## 2019-10-03 PROCEDURE — 93000 ELECTROCARDIOGRAM COMPLETE: CPT | Performed by: INTERNAL MEDICINE

## 2019-10-03 PROCEDURE — 99213 OFFICE O/P EST LOW 20 MIN: CPT | Performed by: INTERNAL MEDICINE

## 2019-10-03 RX ORDER — AMLODIPINE BESYLATE 5 MG/1
5 TABLET ORAL DAILY
Refills: 3 | COMMUNITY
Start: 2019-09-09 | End: 2020-03-12 | Stop reason: SDUPTHER

## 2019-10-03 NOTE — PROGRESS NOTES
Date of Office Visit: 10/03/2019  Encounter Provider: Concepcion Wright MD  Place of Service: [facility] CARDIOLOGY  Patient Name: Tanya Greenwood  :1945    Chief complaint  Followup of coronary artery disease, pulmonary hypertension, hypertension    History of Present Illness  The patient is a 74-year-old female with history of hypertension and hyperlipidemia who in 2008 presented with exertional chest pain in the setting of hypertension. An echocardiogram revealed mild pulmonary hypertension with an RVSP of 39 mmHg with normal systolic function and no significant valvular disease. Cardiac catheterization revealed a 50% ostial right coronary artery stenosis with 20-30% stenosis of the left main, proximal LAD and circumflex vessels. She was treated medically. She had a followup echocardiogram that revealed normal pulmonary pressures in year . She also had a followup PET scan that was negative for ischemia.  In 2016 she had a treadmill exercise stress test that was negative for ischemia.    Since last visit she denies any pain, shortness of breath, palpitations, syncope near syncope.  She has mild dependent edema.  She is walking 3 times a week 5-6 blocks at a time.  She also bicycles for 15 to 20 minutes twice a day    Past Medical History:   Diagnosis Date   • Chronic renal insufficiency     stage 2 (mild)   • Coronary artery disease    • Coronary atherosclerosis    • Essential hypertension    • Hypercholesterolemia    • Palpitations    • Pulmonary hypertension (CMS/HCC)      Past Surgical History:   Procedure Laterality Date   • JOINT REPLACEMENT     • REPLACEMENT TOTAL KNEE Right      Outpatient Medications Prior to Visit   Medication Sig Dispense Refill   • amLODIPine (NORVASC) 5 MG tablet Take 5 mg by mouth Daily.  3   • aspirin 81 MG chewable tablet Chew 81 mg Daily.     • atorvastatin (LIPITOR) 40 MG tablet Take  by mouth Every Night.  1   •  Cholecalciferol (VITAMIN D-3 PO) Take 2,000 Units by mouth Daily.     • ezetimibe (ZETIA) 10 MG tablet TAKE 1 TABLET BY MOUTH DAILY 90 tablet 0   • ferrous sulfate 325 (65 FE) MG tablet Take 65 mg by mouth Daily With Breakfast.     • furosemide (LASIX) 40 MG tablet Take 40 mg by mouth Daily. 1 po Monday, Wednesday, and Friday     • irbesartan (AVAPRO) 300 MG tablet Take 300 mg by mouth Daily. Unless over 140 then one QD  3   • metoprolol succinate XL (TOPROL-XL) 50 MG 24 hr tablet TAKE 1 TABLET BY MOUTH TWICE DAILY 180 tablet 1   • amLODIPine (NORVASC) 2.5 MG tablet TAKE 1 TABLET BY MOUTH DAILY (Patient taking differently: Take 5 mg by mouth Daily.) 90 tablet 1   • benzonatate (TESSALON) 200 MG capsule Take 1 capsule by mouth 3 (Three) Times a Day As Needed for Cough. 30 capsule 0   • ezetimibe (ZETIA) 10 MG tablet TAKE 1 TABLET BY MOUTH DAILY 90 tablet 0   • levocetirizine (XYZAL) 5 MG tablet TAKE 1 TABLET BY MOUTH EVERY EVENING 90 tablet 0   • sulindac (CLINORIL) 200 MG tablet TK 1 T PO BID  0     No facility-administered medications prior to visit.        Allergies as of 10/03/2019 - Reviewed 10/03/2019   Allergen Reaction Noted   • Lipitor [atorvastatin] Other (See Comments) 2018     Social History     Socioeconomic History   • Marital status: Single     Spouse name: Not on file   • Number of children: Not on file   • Years of education: Not on file   • Highest education level: Not on file   Tobacco Use   • Smoking status: Former Smoker     Packs/day: 1.50     Years: 6.00     Pack years: 9.00     Types: Cigarettes     Last attempt to quit:      Years since quittin.7   • Smokeless tobacco: Never Used   Substance and Sexual Activity   • Alcohol use: No     Comment: No caffeine use   • Drug use: No     Family History   Problem Relation Age of Onset   • Diabetes Mother    • Diabetes Father      Review of Systems   Constitution: Negative for fever, malaise/fatigue, weight gain and weight loss.   HENT:  "Negative for ear pain, hearing loss, nosebleeds and sore throat.    Eyes: Negative for double vision, pain, vision loss in left eye and vision loss in right eye.   Cardiovascular: Positive for leg swelling.        See history of present illness.   Respiratory: Negative for cough, shortness of breath, sleep disturbances due to breathing, snoring and wheezing.    Endocrine: Negative for cold intolerance, heat intolerance and polyuria.   Skin: Negative for itching, poor wound healing and rash.   Musculoskeletal: Negative for joint pain, joint swelling and myalgias.   Gastrointestinal: Negative for abdominal pain, diarrhea, hematochezia, nausea and vomiting.   Genitourinary: Negative for hematuria and hesitancy.   Neurological: Negative for numbness, paresthesias and seizures.   Psychiatric/Behavioral: Negative for depression. The patient is not nervous/anxious.         Objective:     Vitals:    10/03/19 1026   BP: 130/80   Pulse: 54   Weight: 89.3 kg (196 lb 12.8 oz)   Height: 167.6 cm (66\")     Body mass index is 31.76 kg/m².    Physical Exam   Constitutional: She is oriented to person, place, and time. She appears well-developed and well-nourished.   Obese   HENT:   Head: Normocephalic.   Nose: Nose normal.   Mouth/Throat: Oropharynx is clear and moist.   Eyes: Conjunctivae and EOM are normal. Pupils are equal, round, and reactive to light. Right eye exhibits no discharge. No scleral icterus.   Neck: Normal range of motion. Neck supple. No JVD present. No thyromegaly present.   Cardiovascular: Normal rate, regular rhythm, normal heart sounds and intact distal pulses. Exam reveals no gallop and no friction rub.   No murmur heard.  Pulses:       Carotid pulses are 2+ on the right side, and 2+ on the left side.       Radial pulses are 2+ on the right side, and 2+ on the left side.        Femoral pulses are 2+ on the right side, and 2+ on the left side.       Popliteal pulses are 2+ on the right side, and 2+ on the left " side.        Dorsalis pedis pulses are 2+ on the right side, and 2+ on the left side.        Posterior tibial pulses are 2+ on the right side, and 2+ on the left side.   Pulmonary/Chest: Effort normal and breath sounds normal. No respiratory distress. She has no wheezes. She has no rales.   Abdominal: Soft. Bowel sounds are normal. She exhibits no distension. There is no hepatosplenomegaly. There is no tenderness. There is no rebound.   Musculoskeletal: Normal range of motion. She exhibits no edema or tenderness.   Neurological: She is alert and oriented to person, place, and time.   Skin: Skin is warm and dry. No rash noted. No erythema.   Psychiatric: She has a normal mood and affect. Her behavior is normal. Judgment and thought content normal.   Vitals reviewed.    Lab Review:     ECG 12 Lead  Date/Time: 10/3/2019 10:27 AM  Performed by: Concepcion Wright MD  Authorized by: Concepcion Wright MD   Comparison: compared with previous ECG   Similar to previous ECG  Rhythm: sinus rhythm  Conduction: 1st degree AV block  Other findings: non-specific ST-T wave changes and left ventricular hypertrophy    Clinical impression: abnormal EKG          Assessment:       Diagnosis Plan   1. Coronary artery disease involving native coronary artery of native heart without angina pectoris  ECG 12 Lead   2. Essential hypertension     3. Hypercholesterolemia     4. Chronic renal impairment, stage 2 (mild)       Plan:       1.  Coronary artery disease, modest with no anginal symptoms no ischemia on a stress test in December 2016. Plan on follow-up in a year  2.  Hypertension.  Borderline elevated. She will try to increase exercise.  3.  Dyslipidemia. Aim for goal LDL < 70, HDL greater than 50 triglyceride assessment 60.  She has labs coming up.  4.  History of transient pulmonary hypertension   5.  Renal insufficiency, labs stable with creatinine 1.27 and GFR of 50 in April 2019   6.  Asymptomatic bradycardia.  Unchanged.  7.  Elevated  glucose, low carb diet again recommended  8.  Atherosclerosis in the aorta noted on vascular screening study in September 2018.  No significant obstructive disease in the carotids in the abdomen and the legs on the screening study.       Your medication list           Accurate as of 10/3/19 11:59 PM. If you have any questions, ask your nurse or doctor.               CONTINUE taking these medications      Instructions Last Dose Given Next Dose Due   amLODIPine 5 MG tablet  Commonly known as:  NORVASC      Take 5 mg by mouth Daily.       aspirin 81 MG chewable tablet      Chew 81 mg Daily.       atorvastatin 40 MG tablet  Commonly known as:  LIPITOR      Take  by mouth Every Night.       ezetimibe 10 MG tablet  Commonly known as:  ZETIA      TAKE 1 TABLET BY MOUTH DAILY       ferrous sulfate 325 (65 FE) MG tablet      Take 65 mg by mouth Daily With Breakfast.       furosemide 40 MG tablet  Commonly known as:  LASIX      Take 40 mg by mouth Daily. 1 po Monday, Wednesday, and Friday       irbesartan 300 MG tablet  Commonly known as:  AVAPRO      Take 300 mg by mouth Daily. Unless over 140 then one QD       metoprolol succinate XL 50 MG 24 hr tablet  Commonly known as:  TOPROL-XL      TAKE 1 TABLET BY MOUTH TWICE DAILY       VITAMIN D-3 PO      Take 2,000 Units by mouth Daily.          STOP taking these medications    benzonatate 200 MG capsule  Commonly known as:  TESSALON  Stopped by:  Concepcion Wright MD        levocetirizine 5 MG tablet  Commonly known as:  XYZAL  Stopped by:  Concepcion Wright MD        sulindac 200 MG tablet  Commonly known as:  CLINORIL  Stopped by:  Concepcion Wright MD               Patient is no longer taking -.  I corrected the med list to reflect this.  I did not stop these medications.    Dictated utilizing Dragon dictation

## 2019-10-22 ENCOUNTER — OFFICE VISIT (OUTPATIENT)
Dept: INTERNAL MEDICINE | Facility: CLINIC | Age: 74
End: 2019-10-22

## 2019-10-22 VITALS
OXYGEN SATURATION: 98 % | SYSTOLIC BLOOD PRESSURE: 140 MMHG | HEART RATE: 48 BPM | HEIGHT: 66 IN | DIASTOLIC BLOOD PRESSURE: 82 MMHG | WEIGHT: 200 LBS | BODY MASS INDEX: 32.14 KG/M2

## 2019-10-22 DIAGNOSIS — I27.20 PULMONARY HTN (HCC): ICD-10-CM

## 2019-10-22 DIAGNOSIS — I10 ESSENTIAL HYPERTENSION: ICD-10-CM

## 2019-10-22 DIAGNOSIS — R07.89 ATYPICAL CHEST PAIN: ICD-10-CM

## 2019-10-22 DIAGNOSIS — I25.10 CORONARY ARTERY DISEASE INVOLVING NATIVE CORONARY ARTERY OF NATIVE HEART WITHOUT ANGINA PECTORIS: ICD-10-CM

## 2019-10-22 DIAGNOSIS — R00.2 PALPITATIONS: Primary | ICD-10-CM

## 2019-10-22 PROCEDURE — 99214 OFFICE O/P EST MOD 30 MIN: CPT | Performed by: INTERNAL MEDICINE

## 2019-10-22 PROCEDURE — 93000 ELECTROCARDIOGRAM COMPLETE: CPT | Performed by: INTERNAL MEDICINE

## 2019-10-22 NOTE — PROGRESS NOTES
Subjective   Tanya Greenwood is a 74 y.o. female.  Presents with chief complaint of known coronary artery disease and hypertension with episodes of palpitations substernal chest tightness with left jaw pain over the last month, no associated shortness of breath nausea vomiting or diaphoresis.  Concurrently she has hyperlipidemia and pulmonary hypertension, both stable at this time.  Her EKG today showed sinus bradycardia without any indications of ischemia however I am going to take the additional step of ordering a stress test for further clarification.    History of Present Illness patient has been having episodes of palpitations and substernal discomfort with left jaw pain over the last month    The following portions of the patient's history were reviewed and updated as appropriate: allergies, current medications, past family history, past medical history, past social history, past surgical history and problem list.    Review of Systems   Constitutional: Negative.    HENT: Negative.    Eyes: Negative.    Respiratory: Positive for chest tightness.    Cardiovascular: Positive for palpitations.   Gastrointestinal: Negative.    Endocrine: Negative.    Genitourinary: Negative.    Musculoskeletal: Positive for arthralgias.   Allergic/Immunologic: Negative.    Neurological: Negative.    Hematological: Negative.    Psychiatric/Behavioral: Negative.        Objective   Physical Exam   Constitutional: She appears well-developed and well-nourished.   HENT:   Head: Normocephalic and atraumatic.   Eyes: EOM are normal. Pupils are equal, round, and reactive to light.   Neck: Normal range of motion. Neck supple.   Cardiovascular: Normal rate, regular rhythm and normal heart sounds.   Pulmonary/Chest: Effort normal and breath sounds normal.   Abdominal: Soft.   Musculoskeletal:   Mild diffuse arthritis   Neurological: She is alert.   Skin: Skin is warm.   Psychiatric: She has a normal mood and affect.   Nursing note and vitals  reviewed.        Assessment/Plan   Tanya was seen today for hypertension and hyperlipidemia.    Diagnoses and all orders for this visit:    Palpitations  Comments:  EKG today shows a sinus bradycardia  Orders:  -     ECG 12 Lead  -     Stress test with myocardial perfusion; Future    Atypical chest pain  Comments:  I have scheduled a stress test  Orders:  -     Stress test with myocardial perfusion; Future    Pulmonary HTN (CMS/HCC)  Comments:  Stable for now no changes indicated    Essential hypertension  Comments:  Moderately well-controlled  Orders:  -     Comprehensive metabolic panel; Future    Coronary artery disease involving native coronary artery of native heart without angina pectoris  Comments:  I am concerned to the patient's atypical chest pain with radiation features to her left jaw and shoulder and have scheduled a stress test

## 2019-10-23 LAB
ALBUMIN SERPL-MCNC: 4.6 G/DL (ref 3.5–4.8)
ALBUMIN/GLOB SERPL: 1.4 {RATIO} (ref 1.2–2.2)
ALP SERPL-CCNC: 76 IU/L (ref 39–117)
ALT SERPL-CCNC: 18 IU/L (ref 0–32)
AST SERPL-CCNC: 31 IU/L (ref 0–40)
BILIRUB SERPL-MCNC: 1.2 MG/DL (ref 0–1.2)
BUN SERPL-MCNC: 15 MG/DL (ref 8–27)
BUN/CREAT SERPL: 11 (ref 12–28)
CALCIUM SERPL-MCNC: 9.5 MG/DL (ref 8.7–10.3)
CHLORIDE SERPL-SCNC: 107 MMOL/L (ref 96–106)
CO2 SERPL-SCNC: 18 MMOL/L (ref 20–29)
CREAT SERPL-MCNC: 1.37 MG/DL (ref 0.57–1)
GLOBULIN SER CALC-MCNC: 3.2 G/DL (ref 1.5–4.5)
GLUCOSE SERPL-MCNC: 91 MG/DL (ref 65–99)
POTASSIUM SERPL-SCNC: 4.1 MMOL/L (ref 3.5–5.2)
PROT SERPL-MCNC: 7.8 G/DL (ref 6–8.5)
SODIUM SERPL-SCNC: 144 MMOL/L (ref 134–144)

## 2019-10-28 RX ORDER — METOPROLOL SUCCINATE 50 MG/1
TABLET, EXTENDED RELEASE ORAL
Qty: 180 TABLET | Refills: 0 | Status: SHIPPED | OUTPATIENT
Start: 2019-10-28 | End: 2020-11-05 | Stop reason: SDUPTHER

## 2019-11-08 ENCOUNTER — TELEPHONE (OUTPATIENT)
Dept: INTERNAL MEDICINE | Facility: CLINIC | Age: 74
End: 2019-11-08

## 2019-11-08 RX ORDER — DEXTROMETHORPHAN HYDROBROMIDE AND PROMETHAZINE HYDROCHLORIDE 15; 6.25 MG/5ML; MG/5ML
5 SYRUP ORAL 4 TIMES DAILY PRN
Qty: 240 ML | Refills: 0 | Status: SHIPPED | OUTPATIENT
Start: 2019-11-08 | End: 2020-12-08

## 2019-11-08 NOTE — TELEPHONE ENCOUNTER
Pt called and asking if Dr Reese can call her in some cough syrup  [facility] [address]

## 2019-11-14 RX ORDER — EZETIMIBE 10 MG/1
10 TABLET ORAL DAILY
Qty: 90 TABLET | Refills: 1 | Status: SHIPPED | OUTPATIENT
Start: 2019-11-14 | End: 2020-06-05

## 2019-11-18 ENCOUNTER — TELEPHONE (OUTPATIENT)
Dept: INTERNAL MEDICINE | Facility: CLINIC | Age: 74
End: 2019-11-18

## 2019-11-18 NOTE — TELEPHONE ENCOUNTER
Left detailed vm to pt that she can see one of our NP and don't have to wait for OBG referral because that can take some time like 2-3 weeks. Advised to call our office and make suly with NP or if she still needs to go out to let us know if she saw certain OBG and will refer her to.

## 2019-11-18 NOTE — TELEPHONE ENCOUNTER
----- Message from Lindsey Casiano sent at 11/18/2019 11:08 AM EST -----  Pt calling for a referral to a GYN.  She has been having spotting for the last 2-3 days and she is concerned. No abd pain.  Please advise    Pt#[phone]

## 2019-11-25 ENCOUNTER — OFFICE VISIT (OUTPATIENT)
Dept: INTERNAL MEDICINE | Facility: CLINIC | Age: 74
End: 2019-11-25

## 2019-11-25 VITALS
TEMPERATURE: 98.5 F | DIASTOLIC BLOOD PRESSURE: 80 MMHG | WEIGHT: 198 LBS | BODY MASS INDEX: 31.97 KG/M2 | SYSTOLIC BLOOD PRESSURE: 122 MMHG

## 2019-11-25 DIAGNOSIS — N93.9 VAGINAL BLEEDING: Primary | ICD-10-CM

## 2019-11-25 LAB
BACTERIA UR QL AUTO: ABNORMAL /HPF
BILIRUB UR QL STRIP: NEGATIVE
CLARITY UR: CLEAR
CLUE CELLS SPEC QL WET PREP: ABNORMAL
COLOR UR: YELLOW
GLUCOSE UR STRIP-MCNC: NEGATIVE MG/DL
HGB UR QL STRIP.AUTO: ABNORMAL
HYALINE CASTS UR QL AUTO: ABNORMAL /LPF
HYDATID CYST SPEC WET PREP: ABNORMAL
KETONES UR QL STRIP: NEGATIVE
KOH PREP NAIL: NORMAL
LEUKOCYTE ESTERASE UR QL STRIP.AUTO: NEGATIVE
MUCOUS THREADS URNS QL MICRO: ABNORMAL /HPF
NITRITE UR QL STRIP: NEGATIVE
PH UR STRIP.AUTO: 5.5 [PH] (ref 5–8)
PROT UR QL STRIP: NEGATIVE
RBC # UR: ABNORMAL /HPF
REF LAB TEST METHOD: ABNORMAL
SP GR UR STRIP: 1.01 (ref 1–1.03)
SQUAMOUS #/AREA URNS HPF: ABNORMAL /HPF
T VAGINALIS SPEC QL WET PREP: ABNORMAL
UROBILINOGEN UR QL STRIP: ABNORMAL
WBC SPEC QL WET PREP: ABNORMAL
WBC UR QL AUTO: ABNORMAL /HPF
YEAST GENITAL QL WET PREP: ABNORMAL

## 2019-11-25 PROCEDURE — 87220 TISSUE EXAM FOR FUNGI: CPT | Performed by: NURSE PRACTITIONER

## 2019-11-25 PROCEDURE — 99213 OFFICE O/P EST LOW 20 MIN: CPT | Performed by: NURSE PRACTITIONER

## 2019-11-25 PROCEDURE — 81001 URINALYSIS AUTO W/SCOPE: CPT | Performed by: NURSE PRACTITIONER

## 2019-11-25 PROCEDURE — 87210 SMEAR WET MOUNT SALINE/INK: CPT | Performed by: NURSE PRACTITIONER

## 2019-11-25 NOTE — PROGRESS NOTES
Sahara Greenwood is a 74 y.o. female.         She presents for vaginal bleeding x 1 week. It started out with a scant amount bright red blood in her underwear a week now most of the time when she goes to the bathroom she has a scant pink tint to her toilet paper. She denies vaginal discharge, pain, irritation. She is not sexually active.       Vaginal Bleeding   The patient's primary symptoms include vaginal bleeding. The patient's pertinent negatives include no genital itching, genital lesions, genital odor, genital rash, pelvic pain or vaginal discharge. This is a new problem. The problem occurs intermittently. The problem has been unchanged. The patient is experiencing no pain. Pertinent negatives include no abdominal pain, back pain, chills, constipation, discolored urine, dysuria, fever, flank pain, frequency, hematuria, nausea, urgency or vomiting. The vaginal bleeding is spotting. She has not been passing clots. She has not been passing tissue. Nothing aggravates the symptoms. She has tried nothing for the symptoms. She is not sexually active. She is postmenopausal.        The following portions of the patient's history were reviewed and updated as appropriate: allergies, current medications, past social history and problem list.    Review of Systems   Constitutional: Negative for chills and fever.   Gastrointestinal: Negative for abdominal pain, constipation, nausea and vomiting.   Genitourinary: Positive for vaginal bleeding. Negative for dysuria, flank pain, frequency, hematuria, pelvic pain, urgency and vaginal discharge.   Musculoskeletal: Negative for back pain.       Objective     /80 (BP Location: Left arm, Patient Position: Sitting, Cuff Size: Adult)   Temp 98.5 °F (36.9 °C)   Wt 89.8 kg (198 lb)   BMI 31.97 kg/m²     Physical Exam   Constitutional: She appears well-developed and well-nourished.   HENT:   Head: Normocephalic and atraumatic.   Cardiovascular: Normal rate,  regular rhythm and normal heart sounds.   No murmur heard.  Pulmonary/Chest: Effort normal and breath sounds normal. No respiratory distress.   Genitourinary: Vagina normal. There is no rash, tenderness, lesion or injury on the right labia. There is no rash, tenderness, lesion or injury on the left labia. Uterus is not tender. Cervix exhibits discharge (reddish-brown).   Musculoskeletal: Normal range of motion.   Neurological: She is alert.   Skin: Skin is warm and dry.   Psychiatric: She has a normal mood and affect. Her behavior is normal. Judgment and thought content normal.   Vitals reviewed.      Assessment/Plan     Tanya was seen today for vaginal bleeding.    Diagnoses and all orders for this visit:    Vaginal bleeding  -     Urinalysis With Microscopic If Indicated (No Culture) - Urine, Clean Catch  -     Ambulatory Referral to Gynecology  -     KOH Prep - Swab, Cervix  -     Wet Prep, Genital - Swab, Vagina  -     Urinalysis, Microscopic Only - Urine, Clean Catch; Future  -     Urinalysis, Microscopic Only - Urine, Clean Catch

## 2019-11-27 ENCOUNTER — TELEPHONE (OUTPATIENT)
Dept: OBSTETRICS AND GYNECOLOGY | Age: 74
End: 2019-11-27

## 2019-12-02 DIAGNOSIS — Z12.31 ENCOUNTER FOR SCREENING MAMMOGRAM FOR BREAST CANCER: Primary | ICD-10-CM

## 2019-12-03 ENCOUNTER — APPOINTMENT (OUTPATIENT)
Dept: WOMENS IMAGING | Facility: HOSPITAL | Age: 74
End: 2019-12-03

## 2019-12-03 ENCOUNTER — OFFICE VISIT (OUTPATIENT)
Dept: INTERNAL MEDICINE | Facility: CLINIC | Age: 74
End: 2019-12-03

## 2019-12-03 DIAGNOSIS — Z12.31 ENCOUNTER FOR SCREENING MAMMOGRAM FOR BREAST CANCER: ICD-10-CM

## 2019-12-03 PROCEDURE — 77067 SCR MAMMO BI INCL CAD: CPT | Performed by: INTERNAL MEDICINE

## 2019-12-03 PROCEDURE — 77067 SCR MAMMO BI INCL CAD: CPT | Performed by: RADIOLOGY

## 2020-01-06 ENCOUNTER — TELEPHONE (OUTPATIENT)
Dept: OBSTETRICS AND GYNECOLOGY | Age: 75
End: 2020-01-06

## 2020-01-06 NOTE — TELEPHONE ENCOUNTER
----- Message from Lindsey Handy MA sent at 1/6/2020  8:42 AM EST -----  Regarding: rescheduled  This pt is scheduled with sharita on Friday.  She needs to be moved.  The np/pa's are not supposed to see new problem patients.Sharita said they talked about this in a  Meeting.   She needs to be taken off and moved to dr benson's schedule.  Let me know if you have any questions.

## 2020-01-06 NOTE — TELEPHONE ENCOUNTER
Friday's schedule is pretty full.  Suggest another provider.    NOT sure why Sharita cannot see a new problem patient.      What is the patient problem BTW?

## 2020-01-10 ENCOUNTER — PROCEDURE VISIT (OUTPATIENT)
Dept: OBSTETRICS AND GYNECOLOGY | Age: 75
End: 2020-01-10

## 2020-01-10 ENCOUNTER — OFFICE VISIT (OUTPATIENT)
Dept: OBSTETRICS AND GYNECOLOGY | Age: 75
End: 2020-01-10

## 2020-01-10 VITALS
HEIGHT: 66 IN | DIASTOLIC BLOOD PRESSURE: 72 MMHG | BODY MASS INDEX: 31.98 KG/M2 | SYSTOLIC BLOOD PRESSURE: 122 MMHG | WEIGHT: 199 LBS

## 2020-01-10 DIAGNOSIS — R93.89 THICKENED ENDOMETRIUM: ICD-10-CM

## 2020-01-10 DIAGNOSIS — N95.0 PMB (POSTMENOPAUSAL BLEEDING): Primary | ICD-10-CM

## 2020-01-10 PROCEDURE — 58100 BIOPSY OF UTERUS LINING: CPT | Performed by: NURSE PRACTITIONER

## 2020-01-10 PROCEDURE — 76830 TRANSVAGINAL US NON-OB: CPT | Performed by: OBSTETRICS & GYNECOLOGY

## 2020-01-10 PROCEDURE — 99214 OFFICE O/P EST MOD 30 MIN: CPT | Performed by: NURSE PRACTITIONER

## 2020-01-10 NOTE — PROGRESS NOTES
"Sahara Greenwood is a 74 y.o. female is being seen today for   Chief Complaint   Patient presents with   • Gynecologic Exam     new pt, c/o post menopausal bleeding   .    History of Present Illness     New patient of Dr Lyle  Here with postmenopausal bleeding since November  She felt a \"gush\" of something in Early November and it was a small amount of bright red bleeding.    Since then she only sees some brown with wiping but it is consistently noticed  She denies any pain   She saw her PCP who referred her here  No other concerns today  Menopausal since early 50s  She denies any history of any gynecological problems  She has had 4 pregnancies and vaginal deliveries  No history of endometrial cancer in her family  Her sister had breast cancer  She is utd on her mammogram    The following portions of the patient's history were reviewed and updated as appropriate: allergies, current medications, past family history, past medical history, past social history, past surgical history and problem list.    /72   Ht 167.6 cm (66\")   Wt 90.3 kg (199 lb)   Breastfeeding No   BMI 32.12 kg/m²         Review of Systems   Constitutional: Negative.    HENT: Negative.    Eyes: Negative.    Respiratory: Negative.    Cardiovascular: Negative.    Gastrointestinal: Negative.    Endocrine: Negative.    Genitourinary: Positive for vaginal bleeding.   Musculoskeletal: Negative.    Skin: Negative.    Allergic/Immunologic: Negative.    Neurological: Negative.    Hematological: Negative.    Psychiatric/Behavioral: Negative.        Objective   Physical Exam   Constitutional: She is oriented to person, place, and time. She appears well-developed and well-nourished.   Pulmonary/Chest: Effort normal.   Abdominal: Soft. There is no tenderness.   Genitourinary: Vagina normal. Cervix exhibits no motion tenderness, no discharge and no friability.   Neurological: She is alert and oriented to person, place, and time.   Skin: " Skin is warm and dry.   Psychiatric: She has a normal mood and affect. Her behavior is normal.     Endometrial Biopsy    Date of procedure:  1/10/2020    Procedure documentation:    The cervix was grasped anterior at the 12 o'clock position.  The cavity sounded to 7 centimeters.  An endometrial biopsy specimen was obtained and multiple passes.  The tissue was sent for permanent histopathologic evaluation.  Tenaculum was removed from the cervix with scant bleeding. She tolerated it well    Post procedure instructions: She was instructed to call us in 1 week's time if she has not heard from us otherwise.  If there is any significant fever or excessive bleeding or pain she is to call immediately.      Assessment/Plan   Tanya was seen today for gynecologic exam.    Diagnoses and all orders for this visit:    PMB (postmenopausal bleeding)  -     Reference Histopathology; Future  -     Reference Histopathology    Thickened endometrium  -     Reference Histopathology; Future  -     Reference Histopathology        Ultrasound done, endometrial lining is thickened, irregular and heterogeneously dense and vascular.  No obvious masses or lesions on ovaries.  No free fluid.    Discussed with patient.  She is agreeable to doing an endometrial biopsy today.  That was done and sent to pathology.    She will call with any heavy bleeding or concerns.  Follow up to discuss results and plan with Dr Muñiz in 1 week.

## 2020-01-15 DIAGNOSIS — C54.1 ENDOMETRIAL CANCER (HCC): Primary | ICD-10-CM

## 2020-01-15 LAB
DX ICD CODE: NORMAL
PATH REPORT.FINAL DX SPEC: NORMAL
PATH REPORT.GROSS SPEC: NORMAL
PATH REPORT.SITE OF ORIGIN SPEC: NORMAL
PATHOLOGIST NAME: NORMAL
PAYMENT PROCEDURE: NORMAL

## 2020-01-15 NOTE — PROGRESS NOTES
Received call from the pathologist today.  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    I called the patient to discuss results.  She is advised that she is welcome to keep Friday's appointment to discuss things more but I wanted to get her referral placed to GYN oncology to get treatment instituted

## 2020-01-17 ENCOUNTER — OFFICE VISIT (OUTPATIENT)
Dept: OBSTETRICS AND GYNECOLOGY | Age: 75
End: 2020-01-17

## 2020-01-17 VITALS
SYSTOLIC BLOOD PRESSURE: 140 MMHG | BODY MASS INDEX: 31.82 KG/M2 | HEIGHT: 66 IN | WEIGHT: 198 LBS | DIASTOLIC BLOOD PRESSURE: 76 MMHG

## 2020-01-17 DIAGNOSIS — C54.1 ENDOMETRIAL CANCER (HCC): Primary | ICD-10-CM

## 2020-01-17 DIAGNOSIS — N18.2 CHRONIC RENAL IMPAIRMENT, STAGE 2 (MILD): ICD-10-CM

## 2020-01-17 DIAGNOSIS — I10 ESSENTIAL HYPERTENSION: ICD-10-CM

## 2020-01-17 DIAGNOSIS — I27.20 PULMONARY HTN (HCC): ICD-10-CM

## 2020-01-17 DIAGNOSIS — I25.10 CORONARY ARTERY DISEASE INVOLVING NATIVE CORONARY ARTERY OF NATIVE HEART WITHOUT ANGINA PECTORIS: ICD-10-CM

## 2020-01-17 PROCEDURE — 99213 OFFICE O/P EST LOW 20 MIN: CPT | Performed by: OBSTETRICS & GYNECOLOGY

## 2020-01-17 NOTE — PROGRESS NOTES
2020      Patient:  Tanya Greenwood   MR#:[id number]    Office note    Chief Complaint   Patient presents with   • Consult     Discuss test results with patient.        Subjective     History of Present Illness  74 y.o. female  who reports an episode of postmenopausal bleeding that was intermittent and moderate at the end of November that seemingly abated with no bleeding after the single event.  The patient had a pelvic ultrasound that showed a very prominent endometrial lining.  The patient was seen by the nurse practitioner last week and had an endometrial biopsy that returned as papillary serous endometrial adenocarcinoma.    The diagnosis was discussed with the patient over the phone and follow-up with GYN oncology is been arranged for next Wednesday.    The patient presents today with questions regarding treatment of the cancer.  We discussed the protocol for staging the cancer with the appropriate therapy to follow depending on the stage.    With the immediate intervention after single episode of bleeding I encouraged the patient that were hopeful the stage of the disease is early.  We discussed that papillary serous carcinoma carries a slightly poor prognosis.        Patient Active Problem List   Diagnosis   • Hypercholesterolemia   • Essential hypertension   • Coronary artery disease   • Chronic renal insufficiency   • Palpitations   • Encounter for screening for vascular disease   • Pulmonary HTN (CMS/HCC)   • Endometrial cancer (CMS/HCC)       Past Medical History:   Diagnosis Date   • Chronic renal insufficiency     stage 2 (mild)   • Coronary artery disease    • Coronary atherosclerosis    • Essential hypertension    • Hypercholesterolemia    • Palpitations    • Pulmonary hypertension (CMS/HCC)        Past Surgical History:   Procedure Laterality Date   • JOINT REPLACEMENT     • REPLACEMENT TOTAL KNEE Right        Obstetric History:  OB History        4    Para   4    Term    4            AB        Living   4       SAB        TAB        Ectopic        Molar        Multiple        Live Births   4               Menstrual History:     No LMP recorded (lmp unknown). Patient is postmenopausal.       #: 1, Date: , Sex: Female, Weight: None, GA: 40w0d, Delivery: Vaginal, Spontaneous, Apgar1: None, Apgar5: None, Living: Living, Birth Comments: None    #: 2, Date: , Sex: Male, Weight: None, GA: 40w0d, Delivery: Vaginal, Spontaneous, Apgar1: None, Apgar5: None, Living: Living, Birth Comments: None    #: 3, Date: , Sex: Female, Weight: None, GA: 40w0d, Delivery: Vaginal, Spontaneous, Apgar1: None, Apgar5: None, Living: Living, Birth Comments: None    #: 4, Date: , Sex: Male, Weight: None, GA: 40w0d, Delivery: Vaginal, Spontaneous, Apgar1: None, Apgar5: None, Living: Living, Birth Comments: None      Family History   Problem Relation Age of Onset   • Diabetes Mother    • Diabetes Father    • Breast cancer Sister        Social History     Tobacco Use   • Smoking status: Former Smoker     Packs/day: 1.50     Years: 6.00     Pack years: 9.00     Types: Cigarettes     Last attempt to quit:      Years since quittin.0   • Smokeless tobacco: Never Used   Substance Use Topics   • Alcohol use: No     Comment: No caffeine use   • Drug use: No       Lipitor [atorvastatin]      Current Outpatient Medications:   •  amLODIPine (NORVASC) 5 MG tablet, Take 5 mg by mouth Daily., Disp: , Rfl: 3  •  aspirin 81 MG chewable tablet, Chew 81 mg Daily., Disp: , Rfl:   •  atorvastatin (LIPITOR) 40 MG tablet, Take  by mouth Every Night., Disp: , Rfl: 1  •  Cholecalciferol (VITAMIN D-3 PO), Take 2,000 Units by mouth Daily., Disp: , Rfl:   •  ezetimibe (ZETIA) 10 MG tablet, TAKE 1 TABLET BY MOUTH DAILY, Disp: 90 tablet, Rfl: 1  •  ferrous sulfate 325 (65 FE) MG tablet, Take 65 mg by mouth Daily With Breakfast., Disp: , Rfl:   •  furosemide (LASIX) 40 MG tablet, Take 40 mg by mouth Daily. 1 po  "Monday, Wednesday, and Friday, Disp: , Rfl:   •  irbesartan (AVAPRO) 300 MG tablet, Take 300 mg by mouth Daily. Unless over 140 then one QD, Disp: , Rfl: 3  •  metoprolol succinate XL (TOPROL-XL) 50 MG 24 hr tablet, TAKE 1 TABLET BY MOUTH TWICE DAILY, Disp: 180 tablet, Rfl: 0  •  promethazine-dextromethorphan (PROMETHAZINE-DM) 6.25-15 MG/5ML syrup, Take 5 mL by mouth 4 (Four) Times a Day As Needed for Cough., Disp: 240 mL, Rfl: 0    The following portions of the patient's history were reviewed and updated as appropriate: allergies, current medications, past family history, past medical history, past social history, past surgical history and problem list.    Review of Systems   Constitutional: Negative.    Respiratory: Negative.    Cardiovascular: Positive for palpitations.   Gastrointestinal: Negative.    Genitourinary: Positive for menstrual problem and vaginal bleeding.   Psychiatric/Behavioral: Negative.        BP Readings from Last 3 Encounters:   01/17/20 140/76   01/10/20 122/72   11/25/19 122/80      Wt Readings from Last 3 Encounters:   01/17/20 89.8 kg (198 lb)   01/10/20 90.3 kg (199 lb)   11/25/19 89.8 kg (198 lb)      BMI: Estimated body mass index is 31.96 kg/m² as calculated from the following:    Height as of this encounter: 167.6 cm (66\").    Weight as of this encounter: 89.8 kg (198 lb).  BSA: Estimated body surface area is 1.99 meters squared as calculated from the following:    Height as of this encounter: 167.6 cm (66\").    Weight as of this encounter: 89.8 kg (198 lb).    Objective   Physical Exam   Constitutional: She is oriented to person, place, and time. She appears well-developed and well-nourished. No distress.   HENT:   Head: Normocephalic and atraumatic.   Cardiovascular: Normal rate.   Pulmonary/Chest: Effort normal.   Abdominal: Soft. Bowel sounds are normal. She exhibits no distension. There is no tenderness.   Genitourinary:   Genitourinary Comments: Exam not repeated  "   Neurological: She is alert and oriented to person, place, and time.   Skin: Skin is warm and dry.   Psychiatric: She has a normal mood and affect. Her behavior is normal. Judgment and thought content normal.   Nursing note and vitals reviewed.        Assessment/Plan     Tanya was seen today for consult.    Diagnoses and all orders for this visit:    Endometrial cancer (CMS/HCC)    Essential hypertension    Coronary artery disease involving native coronary artery of native heart without angina pectoris    Chronic renal impairment, stage 2 (mild)    Pulmonary HTN (CMS/HCC)      Diagnosis in detail discussed with the patient as well as neck steps and staging.  The patient will need CT scan before surgery but Dr. Powell will order that at [facility] where he can review it.    The patient has follow-up with Dr. Powell next Wednesday    No follow-ups on file.        Noble Muñiz MD   1/17/2020 9:37 AM

## 2020-03-12 ENCOUNTER — TELEPHONE (OUTPATIENT)
Dept: INTERNAL MEDICINE | Facility: CLINIC | Age: 75
End: 2020-03-12

## 2020-03-12 DIAGNOSIS — I10 ESSENTIAL HYPERTENSION: Primary | ICD-10-CM

## 2020-03-12 RX ORDER — AMLODIPINE BESYLATE 5 MG/1
5 TABLET ORAL DAILY
Qty: 90 TABLET | Refills: 3 | Status: SHIPPED | OUTPATIENT
Start: 2020-03-12 | End: 2021-04-15 | Stop reason: SDUPTHER

## 2020-03-12 NOTE — TELEPHONE ENCOUNTER
PATIENT CALLED IN REGARDS TO GETTING A REFILL ON HER AMLODIPINE 5 MG TABLET TO BE SENT TO THE [facility] THAT IS IN HER CHART. THE PATIENT STATES THAT SHE IS OUT OF THIS MEDICATION AND SHE WOULD LIKE FOR THIS TO BE SENT TO HER PHARM THAT IS IN HER CHART AS SOON AS POSSIBLE. PT CAN BE REACHED [phone]

## 2020-04-23 ENCOUNTER — OFFICE VISIT (OUTPATIENT)
Dept: INTERNAL MEDICINE | Facility: CLINIC | Age: 75
End: 2020-04-23

## 2020-04-23 DIAGNOSIS — I10 ESSENTIAL HYPERTENSION: Chronic | ICD-10-CM

## 2020-04-23 DIAGNOSIS — G62.9 NEUROPATHY: Chronic | ICD-10-CM

## 2020-04-23 DIAGNOSIS — N18.2 CHRONIC RENAL IMPAIRMENT, STAGE 2 (MILD): ICD-10-CM

## 2020-04-23 DIAGNOSIS — I27.20 PULMONARY HTN (HCC): Primary | Chronic | ICD-10-CM

## 2020-04-23 DIAGNOSIS — C54.1 ENDOMETRIAL CANCER (HCC): ICD-10-CM

## 2020-04-23 DIAGNOSIS — E78.00 HYPERCHOLESTEROLEMIA: ICD-10-CM

## 2020-04-23 PROCEDURE — 99214 OFFICE O/P EST MOD 30 MIN: CPT | Performed by: INTERNAL MEDICINE

## 2020-04-23 NOTE — PROGRESS NOTES
Sahara Greenwood is a 74 y.o. female.  Patient presents with a chief complaint of well-controlled pulmonary hypertension, hyperlipidemia, essential hypertension is well controlled, chronic renal impairment stage II that has remained stable, endometrial cancer with current chemotherapy which has unfortunately started to cause a peripheral neuropathy in her hands and feet and I asked the patient to please tell the oncologist at her next visit which is in 2 weeks that she is developing a neuropathy.  Her family is doing an excellent job of taking care of her and keeping her from having to go outside and expose herself to the viral pandemic.  She has no new complaints at this time and I am going to renew all of her current medications.  I spent approximately 15 to 20 minutes with this patient      LMP  (LMP Unknown)     There is no height or weight on file to calculate BMI.    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    Review of Systems   Constitutional: Negative.    Respiratory: Negative.    Cardiovascular: Negative.    Gastrointestinal: Negative.    Musculoskeletal: Positive for arthralgias.   Neurological: Positive for numbness.   Psychiatric/Behavioral: Negative.        Objective   Physical Exam   Constitutional: She is oriented to person, place, and time. She appears well-developed and well-nourished.   Cardiovascular:   No cardiovascular complaints reported   Pulmonary/Chest:   No respiratory complaints reported   Abdominal:   No abdominal complaints at this time   Musculoskeletal:   Minor arthritic changes otherwise doing well   Neurological: She is alert and oriented to person, place,  and time.   The patient describes signs and symptoms of early peripheral neuropathy of her hands and feet most likely secondary to her chemotherapy regimen   Psychiatric: She has a normal mood and affect. Her behavior is normal.   Nursing note reviewed.        Assessment/Plan   Diagnoses and all orders for this visit:    Pulmonary HTN (CMS/[facility])  Comments:  well controlled at present    Hypercholesterolemia  Comments:  continue lipitor as prescribed    Essential hypertension  Comments:  well controlled    Chronic renal impairment, stage 2 (mild)  Comments:  will check a CMP  at her next visit    Endometrial cancer (CMS/[facility])  Comments:  currently undergoing chemotherapy every three weeks    Neuropathy  Comments:  affecting her hands and feet

## 2020-05-15 ENCOUNTER — OFFICE VISIT (OUTPATIENT)
Dept: INTERNAL MEDICINE | Facility: CLINIC | Age: 75
End: 2020-05-15

## 2020-05-15 DIAGNOSIS — I10 ESSENTIAL HYPERTENSION: ICD-10-CM

## 2020-05-15 DIAGNOSIS — G62.9 NEUROPATHY: ICD-10-CM

## 2020-05-15 DIAGNOSIS — K21.00 GASTROESOPHAGEAL REFLUX DISEASE WITH ESOPHAGITIS: ICD-10-CM

## 2020-05-15 DIAGNOSIS — C54.1 ENDOMETRIAL CANCER (HCC): Primary | ICD-10-CM

## 2020-05-15 PROCEDURE — 99213 OFFICE O/P EST LOW 20 MIN: CPT | Performed by: INTERNAL MEDICINE

## 2020-05-15 RX ORDER — SUCRALFATE 1 G/1
1 TABLET ORAL 2 TIMES DAILY
Qty: 60 TABLET | Refills: 3 | Status: SHIPPED | OUTPATIENT
Start: 2020-05-15 | End: 2020-10-08 | Stop reason: ALTCHOICE

## 2020-05-15 RX ORDER — LANSOPRAZOLE 30 MG/1
30 CAPSULE, DELAYED RELEASE ORAL DAILY
Qty: 90 CAPSULE | Refills: 3 | Status: SHIPPED | OUTPATIENT
Start: 2020-05-15 | End: 2020-10-08 | Stop reason: ALTCHOICE

## 2020-05-15 NOTE — PROGRESS NOTES
Subjective   Tanya Greenwood is a 75 y.o. female.  Patient presents with chief complaint of new onset gastroesophageal reflux disease with esophagitis symptoms most likely secondary to her chemotherapy that she is undergoing for her endometrial cancer.  Shortly after her last chemotherapy infusion she developed signs and symptoms of esophagitis associated with reflux disease.  I recommended Prevacid 30 mg/day along with Carafate 1 g twice a day.  Fortunately her blood pressures remained stable throughout this course of therapy and her neuropathy has not gotten any worse.    LMP  (LMP Unknown)     There is no height or weight on file to calculate BMI.    History of Present Illness gastroesophageal reflux disease symptoms and esophagitis that occurred shortly after her last chemotherapy    The following portions of the patient's history were reviewed and updated as appropriate: allergies, current medications, past family history, past medical history, past social history, past surgical history and problem list.    Review of Systems   Constitutional: Positive for fatigue.   Respiratory: Negative.    Cardiovascular: Negative.    Gastrointestinal: Positive for GERD and indigestion.       Objective   Physical Exam   Constitutional: She appears well-developed and well-nourished.   Cardiovascular:   No cardiovascular complaints at this time   Pulmonary/Chest:   No pulmonary complaints at this time   Abdominal:   The patient describes symptoms of a burning sensation radiating from her sternum upward into her chest shortly after meals consistent with reflux esophagitis   Nursing note reviewed.        Assessment/Plan   Diagnoses and all orders for this visit:    Endometrial cancer (CMS/[facility])  Comments:  Currently undergoing chemotherapy    Neuropathy  Comments:  Stable at present    Essential hypertension  Comments:  Stable at present    Gastroesophageal reflux disease with esophagitis  Comments:  Prevacid 30 mg/day with  Carafate 1 g twice a day    Other orders  -     lansoprazole (PREVACID) 30 MG capsule; Take 1 capsule by mouth Daily.  -     sucralfate (CARAFATE) 1 g tablet; Take 1 tablet by mouth 2 (Two) Times a Day.

## 2020-06-05 RX ORDER — EZETIMIBE 10 MG/1
10 TABLET ORAL DAILY
Qty: 90 TABLET | Refills: 1 | Status: SHIPPED | OUTPATIENT
Start: 2020-06-05 | End: 2021-01-18

## 2020-09-22 ENCOUNTER — OFFICE VISIT (OUTPATIENT)
Dept: INTERNAL MEDICINE | Facility: CLINIC | Age: 75
End: 2020-09-22

## 2020-09-22 VITALS
WEIGHT: 202.9 LBS | SYSTOLIC BLOOD PRESSURE: 128 MMHG | OXYGEN SATURATION: 98 % | RESPIRATION RATE: 16 BRPM | HEART RATE: 65 BPM | DIASTOLIC BLOOD PRESSURE: 78 MMHG | BODY MASS INDEX: 31.84 KG/M2 | HEIGHT: 67 IN

## 2020-09-22 DIAGNOSIS — C54.1 ENDOMETRIAL CANCER (HCC): Primary | ICD-10-CM

## 2020-09-22 DIAGNOSIS — N18.2 CHRONIC RENAL IMPAIRMENT, STAGE 2 (MILD): ICD-10-CM

## 2020-09-22 DIAGNOSIS — G62.9 NEUROPATHY: ICD-10-CM

## 2020-09-22 DIAGNOSIS — E78.00 HYPERCHOLESTEROLEMIA: ICD-10-CM

## 2020-09-22 DIAGNOSIS — I27.20 PULMONARY HTN (HCC): ICD-10-CM

## 2020-09-22 DIAGNOSIS — I10 ESSENTIAL HYPERTENSION: ICD-10-CM

## 2020-09-22 PROCEDURE — 99214 OFFICE O/P EST MOD 30 MIN: CPT | Performed by: INTERNAL MEDICINE

## 2020-09-22 RX ORDER — GABAPENTIN 300 MG/1
300 CAPSULE ORAL 2 TIMES DAILY
COMMUNITY
End: 2021-05-12 | Stop reason: ALTCHOICE

## 2020-09-22 RX ORDER — FAMOTIDINE 20 MG/1
20 TABLET, FILM COATED ORAL DAILY
COMMUNITY
End: 2021-05-12 | Stop reason: ALTCHOICE

## 2020-09-22 NOTE — PROGRESS NOTES
"Sahara Greenwood is a 75 y.o. female.  Patient presents with chief complaint of status post endometrial cancer with extensive chemotherapy, chronic renal impairment stage II pulmonary hypertension stage II hyperlipidemia, hypertension, and ongoing follow-up neuropathy that is somewhat controlled by her use of gabapentin but not entirely so.  I recommended neurological evaluation to see if there is anything we can do to improve her current state      /78   Pulse 65   Resp 16   Ht 170.2 cm (67\")   Wt 92 kg (202 lb 14.4 oz)   LMP  (LMP Unknown)   SpO2 98%   BMI 31.78 kg/m²     Body mass index is 31.78 kg/m².    History of Present Illness neuropathy secondary to chemotherapy    The following portions of the patient's history were reviewed and updated as appropriate: allergies, current medications, past family history, past medical history, past social history, past surgical history and problem list.    Review of Systems   Constitutional: Positive for fatigue.   Respiratory: Negative.    Cardiovascular: Negative.    Gastrointestinal: Negative.    Musculoskeletal: Positive for arthralgias.   Neurological: Negative.    Psychiatric/Behavioral: Negative.        Objective   Physical Exam  Vitals signs and nursing note reviewed.   Constitutional:       Appearance: Normal appearance.   Neck:      Musculoskeletal: Normal range of motion and neck supple.   Cardiovascular:      Rate and Rhythm: Normal rate and regular rhythm.   Pulmonary:      Effort: Pulmonary effort is normal.      Breath sounds: Normal breath sounds.   Abdominal:      General: Abdomen is flat. Bowel sounds are normal.      Palpations: Abdomen is soft.   Musculoskeletal: Normal range of motion.   Neurological:      General: No focal deficit present.      Mental Status: She is alert and oriented to person, place, and time.      Comments: Peripheral neuropathy affecting her hands and feet   Psychiatric:         Mood and Affect: Mood " normal.         Behavior: Behavior normal.           Assessment/Plan   Tanya was seen today for numbness.    Diagnoses and all orders for this visit:    Endometrial cancer (CMS/HCC)  Comments:  Apparently disease-free at present    Chronic renal impairment, stage 2 (mild)  Comments:  I am going to check a CMP today    Pulmonary HTN (CMS/HCC)  Comments:  Continue follow-up with her pulmonary physician    Hypercholesterolemia  Comments:  Continue current therapy    Essential hypertension  Comments:  Well-controlled no changes at this    Neuropathy  Comments:  Neurology follow-up  Orders:  -     Ambulatory Referral to Neurology

## 2020-10-08 ENCOUNTER — OFFICE VISIT (OUTPATIENT)
Dept: CARDIOLOGY | Facility: CLINIC | Age: 75
End: 2020-10-08

## 2020-10-08 VITALS
HEIGHT: 67 IN | WEIGHT: 203 LBS | SYSTOLIC BLOOD PRESSURE: 104 MMHG | BODY MASS INDEX: 31.86 KG/M2 | HEART RATE: 59 BPM | DIASTOLIC BLOOD PRESSURE: 72 MMHG

## 2020-10-08 DIAGNOSIS — R06.09 DOE (DYSPNEA ON EXERTION): Primary | ICD-10-CM

## 2020-10-08 DIAGNOSIS — R29.818 SUSPECTED SLEEP APNEA: ICD-10-CM

## 2020-10-08 DIAGNOSIS — G47.19 OTHER HYPERSOMNIA: ICD-10-CM

## 2020-10-08 DIAGNOSIS — R53.83 FATIGUE, UNSPECIFIED TYPE: ICD-10-CM

## 2020-10-08 DIAGNOSIS — I25.118 CORONARY ARTERY DISEASE INVOLVING NATIVE CORONARY ARTERY OF NATIVE HEART WITH OTHER FORM OF ANGINA PECTORIS (HCC): ICD-10-CM

## 2020-10-08 PROCEDURE — 99214 OFFICE O/P EST MOD 30 MIN: CPT | Performed by: NURSE PRACTITIONER

## 2020-10-08 PROCEDURE — 93000 ELECTROCARDIOGRAM COMPLETE: CPT | Performed by: NURSE PRACTITIONER

## 2020-10-08 NOTE — PROGRESS NOTES
Date of Office Visit: 10/08/2020  Encounter Provider: Joana Christian, ABHIJIT, APRN  Place of Service: [facility]  Patient Name: Tanya Greenwood  :1945        Subjective:     Chief Complaint:  Follow-up, coronary artery disease, hypertension, shortness of breath      History of Present Illness:  Tanya Greenwood is a 75 y.o. female patient of Dr. Wright.  This is my first time seeing this patient in the office today and I have reviewed her records.    Patient has a history of coronary artery disease, hypertension, pulmonary hypertension, hyperlipidemia, shortness of breath, fatigue, uterine cancer status post chemotherapy and radiation.    In 2008 patient complained of exertional chest pain in the setting of hypertension.  Echocardiogram showed mild pulmonary hypertension with RVSP of 39 mmHg with normal systolic function and no significant valvular disease.  Cardiac catheterization showed 50% ostial right coronary artery stenosis with 20 to 30% stenosis of the left main, proximal LAD, and circumflex vessels.  She was treated medically.  Follow-up echocardiogram in  showed normal pulmonary pressures.  PET stress test was also negative for ischemia.  2016 she had a treadmill exercise stress test that was negative for ischemia.  She was last seen in the office 10/2019 by Dr. Wright at which point she was staying active without anginal symptoms.      Patient presents to office today for follow-up appointment.  Patient reports she is doing okay overall since last visit.  She was diagnosed with uterine cancer in January and had chemo and radiation which she completed in  and July respectively.  She reports she is now cancer free.  She does however have neuropathy in her hands and feet and they think that it is from the chemotherapy, per patient.  She reports at home blood pressure runs around 120s over 70s.  She reports that in the past she was having a  brief fluttering sensation however none recently.  We discussed avoiding stimulants.  She denies any chest pain or discomfort, dizziness, syncope, falls, or abnormal bleeding.  She has been having shortness of breath with activities which is new since last visit though she has noticed some improvement as she has increased her time on her exercise bike.  She is also having a lot of fatigue with activities though it does not limit her activities.  She does have a history of snoring as well.          Past Medical History:   Diagnosis Date   • Cancer (CMS/HCC)     uterus   • Chronic renal insufficiency     stage 2 (mild)   • Coronary artery disease    • Coronary atherosclerosis    • Essential hypertension    • Hypercholesterolemia    • Palpitations    • Pulmonary hypertension (CMS/HCC)      Past Surgical History:   Procedure Laterality Date   • HYSTERECTOMY     • JOINT REPLACEMENT     • REPLACEMENT TOTAL KNEE Right 2013     Outpatient Medications Prior to Visit   Medication Sig Dispense Refill   • amLODIPine (NORVASC) 5 MG tablet Take 1 tablet by mouth Daily. 90 tablet 3   • aspirin 81 MG chewable tablet Chew 81 mg Daily.     • atorvastatin (LIPITOR) 40 MG tablet Take  by mouth Every Night.  1   • Cholecalciferol (VITAMIN D-3 PO) Take 2,000 Units by mouth Daily.     • ezetimibe (ZETIA) 10 MG tablet TAKE 1 TABLET BY MOUTH DAILY 90 tablet 1   • famotidine (PEPCID) 20 MG tablet Take 20 mg by mouth Daily.     • ferrous sulfate 325 (65 FE) MG tablet Take 65 mg by mouth Daily With Breakfast.     • furosemide (LASIX) 40 MG tablet Take 40 mg by mouth Daily. 1 po Monday, Wednesday, and Friday     • gabapentin (NEURONTIN) 300 MG capsule Take 300 mg by mouth 2 (two) times a day.     • irbesartan (AVAPRO) 300 MG tablet Take 300 mg by mouth Daily. Unless over 140 then one QD  3   • magnesium oxide (MAGOX) 400 (241.3 Mg) MG tablet tablet Take 400 mg by mouth Daily.     • metoprolol succinate XL (TOPROL-XL) 50 MG 24 hr tablet TAKE 1  TABLET BY MOUTH TWICE DAILY 180 tablet 0   • promethazine-dextromethorphan (PROMETHAZINE-DM) 6.25-15 MG/5ML syrup Take 5 mL by mouth 4 (Four) Times a Day As Needed for Cough. 240 mL 0   • lansoprazole (PREVACID) 30 MG capsule Take 1 capsule by mouth Daily. 90 capsule 3   • sucralfate (CARAFATE) 1 g tablet Take 1 tablet by mouth 2 (Two) Times a Day. 60 tablet 3     No facility-administered medications prior to visit.        Allergies as of 10/08/2020 - Reviewed 10/08/2020   Allergen Reaction Noted   • Lipitor [atorvastatin] Other (See Comments) 2018     Social History     Socioeconomic History   • Marital status:      Spouse name: REJI   • Number of children: 4   • Years of education: Not on file   • Highest education level: Not on file   Tobacco Use   • Smoking status: Former Smoker     Packs/day: 1.50     Years: 6.00     Pack years: 9.00     Types: Cigarettes     Quit date:      Years since quittin.7   • Smokeless tobacco: Never Used   Substance and Sexual Activity   • Alcohol use: No     Comment: No caffeine use   • Drug use: No   • Sexual activity: Not Currently     Partners: Male     Birth control/protection: Post-menopausal     Family History   Problem Relation Age of Onset   • Diabetes Mother    • Diabetes Father    • Breast cancer Sister        Review of Systems   Constitution: Positive for malaise/fatigue.   Cardiovascular: Positive for dyspnea on exertion and leg swelling (Occasional, atrtributes to neuropathy). Negative for chest pain, palpitations (Resolved) and syncope.        SEE HPI    Respiratory: Positive for snoring.    Hematologic/Lymphatic: Negative for bleeding problem.   Musculoskeletal: Negative for falls.   Gastrointestinal: Negative for melena.   Genitourinary: Negative for hematuria.   Neurological: Negative for dizziness.   Psychiatric/Behavioral: Negative for altered mental status.          Objective:     Vitals:    10/08/20 0919   BP: 104/72   BP Location: Left arm  "  Cuff Size: Adult   Pulse: 59   Weight: 92.1 kg (203 lb)   Height: 170.2 cm (67\")     Body mass index is 31.79 kg/m².      PHYSICAL EXAM:  Constitutional:       General: Not in acute distress.     Appearance: Well-developed. Not diaphoretic.   Eyes:      Pupils: Pupils are equal, round, and reactive to light.   HENT:      Head: Normocephalic and atraumatic.   Neck:      Musculoskeletal: Neck supple.      Vascular: No carotid bruit or JVD.   Pulmonary:      Effort: Pulmonary effort is normal. No respiratory distress.      Breath sounds: Normal breath sounds. No wheezing. No rales.   Cardiovascular:      Normal rate. Regular rhythm.      Murmurs: There is a grade 1/6 systolic murmur at the URSB.      No gallop. No click. No rub.   Edema:     Peripheral edema absent.   Abdominal:      General: Bowel sounds are normal. There is no distension.      Palpations: Abdomen is soft.   Musculoskeletal:         General: No tenderness or deformity.   Skin:     General: Skin is warm and dry.      Findings: No erythema or rash.   Neurological:      Mental Status: Alert and oriented to person, place, and time.   Psychiatric:         Behavior: Behavior normal.         Judgment: Judgment normal.             ECG 12 Lead    Date/Time: 10/8/2020 9:41 AM  Performed by: Joana Christian DNP, APRN  Authorized by: Joana Christian DNP, NAREN   Comparison: compared with previous ECG from 10/22/2019  Rhythm: sinus rhythm  BPM: 59  Other findings: non-specific ST-T wave changes  Comments: No significant changes from previous EKG              Assessment:       Diagnosis Plan   1. JORDAN (dyspnea on exertion)  Stress Test With Myocardial Perfusion    Adult Transthoracic Echo Complete W/ Cont if Necessary Per Protocol   2. Fatigue, unspecified type  Stress Test With Myocardial Perfusion    Adult Transthoracic Echo Complete W/ Cont if Necessary Per Protocol    Home Sleep Study   3. Suspected sleep apnea  Home Sleep Study   4. Other hypersomnia   " Home Sleep Study   5. Coronary artery disease involving native coronary artery of native heart with other form of angina pectoris (CMS/HCC)           Plan:     1. Shortness of breath with exertion: This is new since last visit however she has noticed some improvement since she has been doing her exercise bike more regularly.  However she is also having a lot of fatigue with activities though not activity limiting.  Given her history we will check echo and stress test.  Also recommended home sleep study for suspected sleep apnea.  If echo and stress test normal would recommend continuing to slowly increase her time on the exercise bike and notifying office if symptoms do not improve or if they worsen.  2. Suspected sleep apnea: Sleep history reviewed with patient.   She has a history of fatigue and snoring.  Decision to order home sleep study reviewed with patient and patient is agreeable.  3. Coronary artery disease: Modest.  No ischemia on 12/2016 stress test.  Plan as above.  4. Hypertension: Blood pressure well controlled in the office today and running around 120s over 70s at home.  Patient continue to monitor.  5. History of palpitations: She reports a brief flutter sensation lasting a couple seconds in the past however none at all recently.  She will avoid stimulants and notify office of any recurrence at which point could consider monitor based on symptoms.  6. Dyslipidemia: Managed by outside provider.  LDL goal less than 70.  On statin and Zetia.  7. Atherosclerosis in the abdominal aorta: Noted on vascular screening 9/2018.  Carotid ultrasounds were normal bilateral as were peripheral arterial pressures.  Recommended repeating vascular screening bundle.  Handout given to schedule.  8. History of transient pulmonary hypertension: Not seen on last echo.  9. Asymptomatic bradycardia: Unchanged.  Monitor heart rate at home and notify office for heart rate readings less than 50 bpm.  10. History of elevated  blood sugar: Managed by outside provider  11. Renal insufficiency: Managed by outside provider.  12. Uterine cancer: Status post chemo and radiation completed June and July respectively.  She reports she is now cancer free.  She is not sure what type of chemotherapy was used.  13. Neuropathy: In her hands and feet.  She has an upcoming appointment with neurology to evaluate further.  She reports that they think that this is from the chemotherapy.  Did recommend also doing peripheral arterial screenings as above as part of screening bundle.    Patient to follow-up with Dr. Wright in 6 months or sooner if needed for any new, recurrent, or worsening symptoms or other issues or concerns.             Your medication list          Accurate as of October 8, 2020 10:02 AM. If you have any questions, ask your nurse or doctor.            CONTINUE taking these medications      Instructions Last Dose Given Next Dose Due   amLODIPine 5 MG tablet  Commonly known as: NORVASC      Take 1 tablet by mouth Daily.       aspirin 81 MG chewable tablet      Chew 81 mg Daily.       atorvastatin 40 MG tablet  Commonly known as: LIPITOR      Take  by mouth Every Night.       ezetimibe 10 MG tablet  Commonly known as: ZETIA      TAKE 1 TABLET BY MOUTH DAILY       famotidine 20 MG tablet  Commonly known as: PEPCID      Take 20 mg by mouth Daily.       ferrous sulfate 325 (65 FE) MG tablet      Take 65 mg by mouth Daily With Breakfast.       furosemide 40 MG tablet  Commonly known as: LASIX      Take 40 mg by mouth Daily. 1 po Monday, Wednesday, and Friday       gabapentin 300 MG capsule  Commonly known as: NEURONTIN      Take 300 mg by mouth 2 (two) times a day.       irbesartan 300 MG tablet  Commonly known as: AVAPRO      Take 300 mg by mouth Daily. Unless over 140 then one QD       magnesium oxide 400 (241.3 Mg) MG tablet tablet  Commonly known as: MAGOX      Take 400 mg by mouth Daily.       metoprolol succinate XL 50 MG 24 hr  tablet  Commonly known as: TOPROL-XL      TAKE 1 TABLET BY MOUTH TWICE DAILY       promethazine-dextromethorphan 6.25-15 MG/5ML syrup  Commonly known as: PROMETHAZINE-DM      Take 5 mL by mouth 4 (Four) Times a Day As Needed for Cough.       VITAMIN D-3 PO      Take 2,000 Units by mouth Daily.            I did not stop or change the above medications.  Patient's medication list was updated to reflect medications they are currently taking including medication changes made by other providers.            Thanks,    Joana Christian, DNP, APRN  10/08/2020         Dictated utilizing Dragon dictation

## 2020-10-27 ENCOUNTER — HOSPITAL ENCOUNTER (OUTPATIENT)
Dept: CARDIOLOGY | Facility: HOSPITAL | Age: 75
Discharge: HOME | End: 2020-10-27

## 2020-10-27 VITALS
WEIGHT: 203 LBS | HEIGHT: 67 IN | SYSTOLIC BLOOD PRESSURE: 120 MMHG | BODY MASS INDEX: 31.86 KG/M2 | DIASTOLIC BLOOD PRESSURE: 80 MMHG

## 2020-10-27 DIAGNOSIS — R53.83 FATIGUE, UNSPECIFIED TYPE: ICD-10-CM

## 2020-10-27 DIAGNOSIS — R06.09 DOE (DYSPNEA ON EXERTION): ICD-10-CM

## 2020-10-27 LAB
BH CV NUCLEAR PRIOR STUDY: 3
BH CV STRESS BP STAGE 1: NORMAL
BH CV STRESS COMMENTS STAGE 1: NORMAL
BH CV STRESS DOSE REGADENOSON STAGE 1: 0.4
BH CV STRESS DURATION MIN STAGE 1: 0
BH CV STRESS DURATION SEC STAGE 1: 10
BH CV STRESS HR STAGE 1: 86
BH CV STRESS PROTOCOL 1: NORMAL
BH CV STRESS RECOVERY BP: NORMAL MMHG
BH CV STRESS RECOVERY HR: 68 BPM
BH CV STRESS STAGE 1: 1
LV EF NUC BP: 81 %
MAXIMAL PREDICTED HEART RATE: 146 BPM
PERCENT MAX PREDICTED HR: 58.9 %
STRESS BASELINE BP: NORMAL MMHG
STRESS BASELINE HR: 59 BPM
STRESS PERCENT HR: 69 %
STRESS POST EXERCISE DUR SEC: 10 SEC
STRESS POST PEAK BP: NORMAL MMHG
STRESS POST PEAK HR: 86 BPM
STRESS TARGET HR: 124 BPM

## 2020-10-27 PROCEDURE — 25010000002 AMINOPHYLLINE PER 250 MG: Performed by: NURSE PRACTITIONER

## 2020-10-27 PROCEDURE — 0 RUBIDIUM CHLORIDE: Performed by: NURSE PRACTITIONER

## 2020-10-27 PROCEDURE — 93306 TTE W/DOPPLER COMPLETE: CPT

## 2020-10-27 PROCEDURE — 93018 CV STRESS TEST I&R ONLY: CPT | Performed by: INTERNAL MEDICINE

## 2020-10-27 PROCEDURE — 78492 MYOCRD IMG PET MLT RST&STRS: CPT

## 2020-10-27 PROCEDURE — A9555 RB82 RUBIDIUM: HCPCS | Performed by: NURSE PRACTITIONER

## 2020-10-27 PROCEDURE — 93017 CV STRESS TEST TRACING ONLY: CPT

## 2020-10-27 PROCEDURE — 93306 TTE W/DOPPLER COMPLETE: CPT | Performed by: INTERNAL MEDICINE

## 2020-10-27 PROCEDURE — 93016 CV STRESS TEST SUPVJ ONLY: CPT | Performed by: INTERNAL MEDICINE

## 2020-10-27 PROCEDURE — 25010000002 REGADENOSON 0.4 MG/5ML SOLUTION: Performed by: NURSE PRACTITIONER

## 2020-10-27 PROCEDURE — 78492 MYOCRD IMG PET MLT RST&STRS: CPT | Performed by: INTERNAL MEDICINE

## 2020-10-27 RX ORDER — AMINOPHYLLINE DIHYDRATE 25 MG/ML
125 INJECTION, SOLUTION INTRAVENOUS ONCE AS NEEDED
Status: COMPLETED | OUTPATIENT
Start: 2020-10-27 | End: 2020-10-27

## 2020-10-27 RX ADMIN — REGADENOSON 0.4 MG: 0.08 INJECTION, SOLUTION INTRAVENOUS at 09:28

## 2020-10-27 RX ADMIN — AMINOPHYLLINE 125 MG: 25 INJECTION, SOLUTION INTRAVENOUS at 09:30

## 2020-10-28 ENCOUNTER — TELEPHONE (OUTPATIENT)
Dept: CARDIOLOGY | Facility: CLINIC | Age: 75
End: 2020-10-28

## 2020-10-28 NOTE — PROGRESS NOTES
Called and spoke with the patient regarding her testing.  Echo was normal EF 57% trace TR with normal RVSP 26 mmHgTrace MR no other significant valvular disease there is trivial pericardial effusion she had normal LV wall thickness, cavity size, contractility and grade 1 diastolic dysfunction.  Stress test was low risk and without ischemia.  She states she is scheduled for home sleep study 11/4/2020.  She was advised to call for results within 2 weeks after completing.  She states she has been doing exercises for her hip at therapy and the exercise bike actually helps her breathing.  She was again advised to call with heart rates less than 50 or any abnormal blood pressures.  She will call with worsening symptoms between now and April 2021 appointment if she creases her exercise regimen.  Patient verbalized understanding of the plan of care.

## 2020-11-04 ENCOUNTER — HOSPITAL ENCOUNTER (OUTPATIENT)
Dept: SLEEP MEDICINE | Facility: HOSPITAL | Age: 75
Discharge: HOME | End: 2020-11-04
Admitting: NURSE PRACTITIONER

## 2020-11-04 DIAGNOSIS — R53.83 FATIGUE, UNSPECIFIED TYPE: ICD-10-CM

## 2020-11-04 DIAGNOSIS — R29.818 SUSPECTED SLEEP APNEA: ICD-10-CM

## 2020-11-04 DIAGNOSIS — G47.19 OTHER HYPERSOMNIA: ICD-10-CM

## 2020-11-04 PROCEDURE — 95806 SLEEP STUDY UNATT&RESP EFFT: CPT

## 2020-11-04 PROCEDURE — G0399 HOME SLEEP TEST/TYPE 3 PORTA: HCPCS | Performed by: INTERNAL MEDICINE

## 2020-11-05 ENCOUNTER — TELEPHONE (OUTPATIENT)
Dept: INTERNAL MEDICINE | Facility: CLINIC | Age: 75
End: 2020-11-05

## 2020-11-05 RX ORDER — METOPROLOL SUCCINATE 50 MG/1
50 TABLET, EXTENDED RELEASE ORAL 2 TIMES DAILY
Qty: 180 TABLET | Refills: 0 | Status: SHIPPED | OUTPATIENT
Start: 2020-11-05 | End: 2021-02-08

## 2020-11-05 NOTE — TELEPHONE ENCOUNTER
Patient called in and is requesting a refill of metoprolol succinate XL (TOPROL-XL) 50 MG 24 hr tablet    Patient is out of medication         Sent to [id number] - [place], [place] - [address] AT [address] [place] [address] - [phone]  - [phone]   [phone]        Patient call back [phone]

## 2020-11-16 ENCOUNTER — TELEPHONE (OUTPATIENT)
Dept: CARDIOLOGY | Facility: CLINIC | Age: 75
End: 2020-11-16

## 2020-11-16 NOTE — TELEPHONE ENCOUNTER
Home sleep study shows at least mild sleep apnea, possibly underestimated.  Sleep-related hypoxia also seen.  Sleep medicine provider recommending an in office appointment to discuss further.    Called patient to discuss but no answer.  Left a voicemail asking her to call the office back.

## 2020-11-18 NOTE — TELEPHONE ENCOUNTER
Called and discussed results and recommendations with patient.  She already has an appointment with sleep medicine 11/25 and she will keep this.  From a cardiac standpoint did recommend treating sleep apnea IF it is recommended by sleep medicine provider.

## 2020-11-25 ENCOUNTER — OFFICE VISIT (OUTPATIENT)
Dept: SLEEP MEDICINE | Facility: HOSPITAL | Age: 75
End: 2020-11-25

## 2020-11-25 VITALS — OXYGEN SATURATION: 97 % | BODY MASS INDEX: 31.86 KG/M2 | WEIGHT: 203 LBS | HEIGHT: 67 IN | HEART RATE: 63 BPM

## 2020-11-25 DIAGNOSIS — IMO0002 SLEEP-RELATED HYPOVENTILATION: ICD-10-CM

## 2020-11-25 DIAGNOSIS — G47.33 OSA (OBSTRUCTIVE SLEEP APNEA): Primary | ICD-10-CM

## 2020-11-25 DIAGNOSIS — G47.14 HYPERSOMNIA DUE TO MEDICAL CONDITION: ICD-10-CM

## 2020-11-25 PROCEDURE — 99204 OFFICE O/P NEW MOD 45 MIN: CPT | Performed by: INTERNAL MEDICINE

## 2020-11-25 PROCEDURE — G0463 HOSPITAL OUTPT CLINIC VISIT: HCPCS

## 2020-11-25 NOTE — PROGRESS NOTES
[facility]ultation       Reason for Consultation: MOLINA    Patient Care Team:  Anurag Reese MD as PCP - General (Internal Medicine)  Concepcion Wright MD as Consulting Physician (Cardiology)  Clarence Carvajal MD as Consulting Physician (Nephrology)  Yan Quintanilla MD as Consulting Physician (Sleep Medicine)    Chief complaint: MOLINA    History of present illness:    Thank you for asking me to see your patient.  The patient is a 75 y.o. female who has a history of coronary artery disease, hypertension, pulmonary hypertension, hyperlipidemia, shortness of breath, fatigue, and uterine cancer status post chemotherapy and radiation. The patient was recently reevaluated by cardiology and reported that she is doing okay overall.  She was diagnosed with uterine cancer in January and had chemo and radiation which she completed in June and July respectively and she reported she is now cancer free.  She has been having shortness of breath with activities.  She is also having a lot of fatigue with activities though it does not limit her activities.  She does have a history of snoring.  Cardiology requested a home sleep study.    Home sleep study performed 11/4/2020.  Mild MOLINA with AHI 11 events per hour suggested.  Low oxygen saturation was 80% and sleep-related hypoxia present for 14 minutes.  The patient snored 4% of the total monitoring time.  Sleep evaluation requested.    The patient goes to bed between 9:30 PM and 10 PM and arises between 7:30 AM and 8 AM.  She states she feels good upon arising.  However, she will take 1 nap daily.  Her [place] Sleepiness Scale is abnormal at 7-9.  She has gained 20 pounds in the last several years.  She sweats excessively during sleep.  She will have sudden episodes of sleep during the daytime.  She describes weakness with laughing?.  She has problems falling asleep and difficulty staying asleep and she reports frequent awakenings.  She will use  "the restroom twice during the nighttime.    Review of Systems:    A complete review of systems was done and all were negative with the exception of neuropathy in her hands and she is here in a wheelchair    History:  Past Medical History:   Diagnosis Date   • Cancer (CMS/HCC)     uterus   • Chronic renal insufficiency     stage 2 (mild)   • Coronary artery disease    • Coronary atherosclerosis    • Essential hypertension    • Hypercholesterolemia    • MOLINA (obstructive sleep apnea) 2020    Home sleep study.  Mild MOLINA with AHI 11 events per hour.  Low O2 saturation 80% and sleep-related hypoxia present for 14 minutes.  The patient snored 4% of the total monitoring time.   • Palpitations    • Pulmonary hypertension (CMS/HCC)    ,   Past Surgical History:   Procedure Laterality Date   • HYSTERECTOMY     • JOINT REPLACEMENT     • REPLACEMENT TOTAL KNEE Right    ,   Family History   Problem Relation Age of Onset   • Diabetes Mother    • Diabetes Father    • Breast cancer Sister     and   Social History     Tobacco Use   • Smoking status: Former Smoker     Packs/day: 1.50     Years: 6.00     Pack years: 9.00     Types: Cigarettes     Quit date:      Years since quittin.9   • Smokeless tobacco: Never Used   Substance Use Topics   • Alcohol use: No     Comment: No caffeine use   • Drug use: No     E-cigarette/Vaping     E-cigarette/Vaping Substances     E-cigarette/Vaping Devices         Social History: No caffeine    Allergies:  Lipitor [atorvastatin]     Medication Review: Reviewed    Vital Signs:    Vitals:    20 0910   Pulse: 63   SpO2: 97%   Weight: 92.1 kg (203 lb)   Height: 170.2 cm (67\")      Body mass index is 31.79 kg/m².         Physical Exam:    Constitutional:  Well developed 75 y.o. female that appears in no apparent distress.  Awake & oriented times 3.  Normal mood with normal recent and remote memory and normal judgement.  Eyes:  Conjunctivae normal.  Oropharynx: Moist mucous " membranes without exudate and a large tongue and normal uvula and patent posterior pharyngeal opening and class III Mallampati airway  Neck: Trachea midline  Respiratory: Effort is not labored  Cardiovascular: Radial pulse regular  Musculoskeletal: The patient is in a wheelchair, no digital clubbing evident, trace-1+ pre-tibial edema    Results Review: I reviewed the patient's home sleep study with her    Impression:   Obstructive sleep apnea, mild severity suggested, possibly underestimated, with sleep-related hypoxia and complaints of hypersomnolence by home sleep study 11/4/2020    Plan:  Good sleep hygiene measures should be maintained.  Weight loss would be beneficial in this patient who is obese.    Pathophysiology of MOLINA described to the patient.  Cardiovascular complications of untreated MOLINA also reviewed.      After reviewing all with the patient, I would recommend and she is agreeable to auto CPAP between 7 and 16 cm of water pressure.  A DreamWear nasal cushion, medium wide, #5, Rahul medium.  Set up will be arranged and I will see the patient back in 10 weeks to review download and to assure compliance.    Thank you for requesting me to assist in this patient's care.    Yan Quintanilla MD  Sleep Medicine  11/28/20  09:29 EST

## 2020-11-28 PROBLEM — G47.33 OSA (OBSTRUCTIVE SLEEP APNEA): Status: ACTIVE | Noted: 2020-11-04

## 2020-11-28 PROBLEM — IMO0002 SLEEP-RELATED HYPOVENTILATION: Status: ACTIVE | Noted: 2020-11-28

## 2020-11-28 PROBLEM — G47.14 HYPERSOMNIA DUE TO MEDICAL CONDITION: Status: ACTIVE | Noted: 2020-11-28

## 2020-11-30 ENCOUNTER — TELEPHONE (OUTPATIENT)
Dept: SLEEP MEDICINE | Facility: HOSPITAL | Age: 75
End: 2020-11-30

## 2020-12-08 ENCOUNTER — TELEPHONE (OUTPATIENT)
Dept: NEUROLOGY | Facility: CLINIC | Age: 75
End: 2020-12-08

## 2020-12-08 ENCOUNTER — OFFICE VISIT (OUTPATIENT)
Dept: NEUROLOGY | Facility: CLINIC | Age: 75
End: 2020-12-08

## 2020-12-08 VITALS
DIASTOLIC BLOOD PRESSURE: 72 MMHG | HEIGHT: 67 IN | HEART RATE: 75 BPM | SYSTOLIC BLOOD PRESSURE: 122 MMHG | WEIGHT: 207 LBS | OXYGEN SATURATION: 99 % | BODY MASS INDEX: 32.49 KG/M2

## 2020-12-08 DIAGNOSIS — D89.89 OTHER SPECIFIED DISORDERS INVOLVING THE IMMUNE MECHANISM, NOT ELSEWHERE CLASSIFIED (HCC): ICD-10-CM

## 2020-12-08 DIAGNOSIS — G62.0 DRUG-INDUCED POLYNEUROPATHY (HCC): ICD-10-CM

## 2020-12-08 DIAGNOSIS — Z13.1 ENCOUNTER FOR SCREENING FOR DIABETES MELLITUS: ICD-10-CM

## 2020-12-08 DIAGNOSIS — G62.9 NEUROPATHY: Primary | ICD-10-CM

## 2020-12-08 PROCEDURE — 99204 OFFICE O/P NEW MOD 45 MIN: CPT | Performed by: PSYCHIATRY & NEUROLOGY

## 2020-12-08 NOTE — TELEPHONE ENCOUNTER
----- Message from Mel Silva MD sent at 12/8/2020 10:25 AM EST -----  Can we request records from [facility]?  I would like to know what chemo she received.  Thanks!

## 2020-12-08 NOTE — PROGRESS NOTES
Subjective:     Patient ID: Tanya Greenwood is a 75 y.o. female.    Ms. Greenwood 75-year-old left-handed female with a history of cataracts, uterine cancer diagnosed in 2019 status post surgery, chemo, and radiation, hypertension, hyperlipidemia, GERD and recently diagnosed sleep apnea not on treatment yet who presents to the neurology clinic today as a new patient for the evaluation of neuropathy.  I reviewed the patient's records.  I reviewed the referring physician's note from September 22, 2020.  Reports that the patient had endometrial cancer with extensive chemo.  It also reports that she has chronic kidney disease, pulmonary hypertension, hyperlipidemia and hypertension.  Reports that her symptoms are somewhat controlled with gabapentin and reports that her neuropathy is secondary to chemo.  I reviewed the patient's labs.  October 22 her CMP showed a creatinine of 1.37.  In 2018 her TSH was normal.  In 2016 her B12 level was normal.    Symptoms started around April/May (1-2 months after chemo was started).  Has numbness and tingling in hands and feet.  Has pain in feet.  Feet feel heavy.  In both hands and both feet.  Symptoms are symmetric.  Gabapentin has helped a whole lot.  Every now and then has a pain that shoots through hands and feet.  Going to therapy for her balance. Over time, the numbness has gotten better.  No EtOH use.  Has some kidney failure.  No strict diets.  Not associated with any recent travel.  Has never been treated for TB.  Not sure what kind of chemo she received.  Is satisfied with her pain control.      Family history? no  Dry eyes, dry mouth? no  High arches/hammer toes? no  Autonomic (early satiety, bloating, constipation, diarrhea, sweating abnormalities, orthostasis?) Sweats too much at night.  Some orthostasis.    Daily life impairment (handwriting, buttons, jewelry, key turning, tripping, falling, getting up from toilet)? no  Occupational hazards? no  High risk sexual history?  IV drug use? no      The following portions of the patient's history were reviewed and updated as appropriate: allergies, current medications, past family history, past medical history, past social history, past surgical history and problem list.    Review of Systems   Constitutional: Negative for activity change, appetite change and fatigue.   HENT: Negative for ear pain, tinnitus and trouble swallowing.    Eyes: Negative for photophobia, pain and visual disturbance.   Respiratory: Negative for cough, chest tightness and shortness of breath.    Cardiovascular: Positive for palpitations and leg swelling.   Endocrine: Negative for cold intolerance, heat intolerance and polydipsia.   Musculoskeletal: Positive for back pain. Negative for gait problem and neck pain.   Allergic/Immunologic: Negative for environmental allergies, food allergies and immunocompromised state.   Neurological: Positive for numbness. Negative for dizziness, tremors, seizures, syncope, facial asymmetry, speech difficulty, weakness, light-headedness and headaches.   Hematological: Negative for adenopathy. Does not bruise/bleed easily.   Psychiatric/Behavioral: Negative for agitation, behavioral problems, confusion, decreased concentration, dysphoric mood, hallucinations, self-injury, sleep disturbance and suicidal ideas. The patient is not nervous/anxious and is not hyperactive.     I reviewed the ROS documented by the MA.  All other systems negative.      Objective:    Neurologic Exam    Physical Exam   **The patient is wearing a mask**  Constitutional:  Vital signs reviewed.  No apparent distress.  Well groomed.  Eyes:  No injection, no icterus.    Respiratory:  Normal effort.  Clear to auscultation bilaterally.  Cardiovascular:  Regular rate and rhythm.  (+) systolic murmur.  No carotid bruits. Symmetric radial pulses.  Musculoskeletal: Normal station.  Gait steady.  Normal arm swing.  Patient able to walk on heels and toes.  Trouble with  tandem gait.  Romberg negative.  Muscle tone and bulk normal in the bilateral upper and lower extremities.  Strength is 5/5 in the bilateral upper and lower extremities proximally and distally unless otherwise specified in the neurological exam.  Skin:  No rashes.  Warm, dry, and intact.  Psychiatric:  Good mood.  Normal affect.    Neurologic:  Mental status-  The patient is alert and oriented to person, place and time. Attention/concentration is within normal limits.  Speech is fluent without dysarthria.  The patient is able to name, repeat and follow complex commands without difficulty.  Immediate memory and delayed recall intact (3/3 words immediate and after 4 minutes).  Fund of knowledge normal.  Cranial nerves- Pupils equally round and reactive to light with intact accomodation.  Visual fields intact.  Extraocular movements intact.  Facial sensation intact.   Hearing intact to finger-rub bilaterally.  SCM and trapezius are 5/5 bilaterally.    Motor-  See musculoskeletal above.  No tremor.  Reflexes- 2+ in the bilateral biceps, 1+ brachioradialis, patellar and trace achilles.  Toes down-going bilaterally.  Sensation- Intact to pinprick and vibration in bilateral upper and lower extremities symmetrically.  Coordination- Intact to finger tapping and heel knee shin bilaterally.   Gait- See musculoskeletal exam above.       Assessment/Plan:    The patient is a 75-year-old left-handed female with history of cataracts, uterine cancer, hypertension, hyperlipidemia, GERD, and sleep apnea who presents today for the valuation of neuropathy.    1.  Neuropathy-the patient's clinical presentation seems most consistent with chemo-induced neuropathy.  She reports that her pain is adequately treated with her current gabapentin regimen.  For further evaluation we can look for other potential causes of neuropathy.  If her blood work is negative the patient can follow-up with neurology on an as-needed basis.       Problems  Addressed this Visit        Nervous and Auditory    Neuropathy - Primary    Relevant Orders    Vitamin B12    TSH Rfx On Abnormal To Free T4    Protein Elec + Interp, Serum    Immunofixation, Serum    Glucose Tolerance, 2 Hours      Other Visit Diagnoses     Drug-induced polyneuropathy (CMS/HCC)        Relevant Orders    Vitamin B12    TSH Rfx On Abnormal To Free T4    Protein Elec + Interp, Serum    Immunofixation, Serum    Glucose Tolerance, 2 Hours    Other specified disorders involving the immune mechanism, not elsewhere classified (CMS/HCC)         Relevant Orders    TSH Rfx On Abnormal To Free T4    Encounter for screening for diabetes mellitus         Relevant Orders    Glucose Tolerance, 2 Hours      Diagnoses       Codes Comments    Neuropathy    -  Primary ICD-10-CM: G62.9  ICD-9-CM: 355.9     Drug-induced polyneuropathy (CMS/HCC)     ICD-10-CM: G62.0  ICD-9-CM: 357.6, E980.5     Other specified disorders involving the immune mechanism, not elsewhere classified (CMS/HCC)      ICD-10-CM: D89.89  ICD-9-CM: 279.8     Encounter for screening for diabetes mellitus      ICD-10-CM: Z13.1  ICD-9-CM: V77.1

## 2020-12-12 ENCOUNTER — DOCUMENTATION (OUTPATIENT)
Dept: NEUROLOGY | Facility: CLINIC | Age: 75
End: 2020-12-12

## 2020-12-12 NOTE — PROGRESS NOTES
We received blood work that was done on the patient back in March however the requested records from her oncologist were not sent.

## 2021-01-18 RX ORDER — EZETIMIBE 10 MG/1
10 TABLET ORAL DAILY
Qty: 90 TABLET | Refills: 0 | Status: SHIPPED | OUTPATIENT
Start: 2021-01-18 | End: 2021-04-15 | Stop reason: SDUPTHER

## 2021-02-04 ENCOUNTER — OFFICE VISIT (OUTPATIENT)
Dept: SLEEP MEDICINE | Facility: HOSPITAL | Age: 76
End: 2021-02-04

## 2021-02-04 VITALS — HEIGHT: 67 IN | BODY MASS INDEX: 32.96 KG/M2 | WEIGHT: 210 LBS

## 2021-02-04 DIAGNOSIS — G47.33 OSA (OBSTRUCTIVE SLEEP APNEA): Primary | ICD-10-CM

## 2021-02-04 DIAGNOSIS — IMO0002 SLEEP-RELATED HYPOVENTILATION: ICD-10-CM

## 2021-02-04 PROCEDURE — 99213 OFFICE O/P EST LOW 20 MIN: CPT | Performed by: INTERNAL MEDICINE

## 2021-02-04 PROCEDURE — G0463 HOSPITAL OUTPT CLINIC VISIT: HCPCS

## 2021-02-04 NOTE — PROGRESS NOTES
"Follow Up [facility] Disorders Center Note     Chief Complaint:  MOLINA     Primary Care Physician: Garret Cross MD    Interval History:   The patient is a 75 y.o. female who I originally saw 2020.  Home sleep study 2020 demonstrated MOLINA with sleep-related hypoxia.  Auto CPAP initiated.  The patient is here today for follow-up.  She states she is better.  She goes to bed at 9 PM and awakens at 8 AM.  She will use the bathroom during the nighttime.  [place] Sleepiness Scale is normal at 4    Review of Systems:    A complete review of systems was done and all were negative with the exception of the above    Social History:    Social History     Socioeconomic History   • Marital status:      Spouse name: REJI   • Number of children: 4   • Years of education: Not on file   • Highest education level: Not on file   Tobacco Use   • Smoking status: Former Smoker     Packs/day: 1.50     Years: 6.00     Pack years: 9.00     Types: Cigarettes     Quit date:      Years since quittin.1   • Smokeless tobacco: Never Used   Substance and Sexual Activity   • Alcohol use: No     Comment: No caffeine use   • Drug use: No   • Sexual activity: Not Currently     Partners: Male     Birth control/protection: Post-menopausal       Allergies:  Lipitor [atorvastatin]     Medication Review:  Reviewed.      Vital Signs:    Vitals:    21 0923   Weight: 95.3 kg (210 lb)   Height: 170.2 cm (67\")     Body mass index is 32.89 kg/m².    Physical Exam:    Constitutional:  Well developed 75 y.o. female that appears in no apparent distress.  Awake & oriented times 3.  Normal mood with normal recent and remote memory and normal judgement.  Eyes:  Conjunctivae normal.  Oropharynx: Previously, moist mucous membranes without exudate and a large tongue and normal uvula and patent posterior pharyngeal opening and class III Mallampati airway, patient is wearing a facemask.     Results Reviewed:  DME is [facility] and she uses a " [employer] nasal cushion.  Downloads between 12/15 and 2/1/2021 reveals compliance greater than 80%.  Average usage is 4 hours and 43 minutes.  Average AHI is normal at 5.1 with a leak of 2 hours and 40 minutes.  Average AutoCPAP pressure is 7.7 and her auto CPAP is 7-16.    I have reviewed the above results with the patient.    Impression:   Mild obstructive sleep apnea, probably underestimated, with sleep-related hypoxia by home sleep study 11/4/2020 adequately treated with auto CPAP with good compliance and usage and no complaints of hypersomnolence.    Plan:  Good sleep hygiene measures should be maintained.  Weight loss would be beneficial in this patient who is obese by BMI.      After evaluating the patient and assessing results available, the patient is benefiting from the treatment being provided.     The patient will continue auto CPAP.  Mask care described to the patient.    I answered all of the patient's questions.  The patient will call for any problems and will follow up in 4 months.      Yan Quintanilla MD  Sleep Medicine  02/07/21  10:27 EST

## 2021-02-08 RX ORDER — METOPROLOL SUCCINATE 50 MG/1
TABLET, EXTENDED RELEASE ORAL
Qty: 180 TABLET | Refills: 1 | Status: SHIPPED | OUTPATIENT
Start: 2021-02-08 | End: 2022-01-18

## 2021-02-23 NOTE — TELEPHONE ENCOUNTER
PATIENT NEEDS REFILL ON:gabapentin (NEURONTIN) 300 MG capsule     PATIENT CAN BE REACHED ON: [phone]    PHARMACY PREFERRED:[id number] - [place], [place] - [address] [place] [address] AT [address] [place] [address] - [phone]  - [phone]   [phone]

## 2021-02-24 RX ORDER — GABAPENTIN 300 MG/1
300 CAPSULE ORAL 2 TIMES DAILY
OUTPATIENT
Start: 2021-02-24

## 2021-02-24 NOTE — TELEPHONE ENCOUNTER
According to PDMP/[place], we do not prescribe this medication and never have.  She will need to get from the prescribing physician.

## 2021-03-19 ENCOUNTER — TELEPHONE (OUTPATIENT)
Dept: SLEEP MEDICINE | Facility: HOSPITAL | Age: 76
End: 2021-03-19

## 2021-03-19 NOTE — TELEPHONE ENCOUNTER
LV for patient to call back to discuss CPAP usage. James reached out stating the patient was having difficulties being compliant and have a mask leak . Possible early follow up with Dr Quintanilla, mask fit, or morena

## 2021-03-22 DIAGNOSIS — Z12.31 ENCOUNTER FOR SCREENING MAMMOGRAM FOR BREAST CANCER: Primary | ICD-10-CM

## 2021-03-23 ENCOUNTER — APPOINTMENT (OUTPATIENT)
Dept: WOMENS IMAGING | Facility: HOSPITAL | Age: 76
End: 2021-03-23

## 2021-03-23 ENCOUNTER — OFFICE VISIT (OUTPATIENT)
Dept: INTERNAL MEDICINE | Facility: CLINIC | Age: 76
End: 2021-03-23

## 2021-03-23 DIAGNOSIS — Z12.31 ENCOUNTER FOR SCREENING MAMMOGRAM FOR BREAST CANCER: ICD-10-CM

## 2021-03-23 PROCEDURE — 77067 SCR MAMMO BI INCL CAD: CPT | Performed by: RADIOLOGY

## 2021-03-23 PROCEDURE — 77067 SCR MAMMO BI INCL CAD: CPT | Performed by: FAMILY MEDICINE

## 2021-03-25 ENCOUNTER — OFFICE VISIT (OUTPATIENT)
Dept: SLEEP MEDICINE | Facility: HOSPITAL | Age: 76
End: 2021-03-25

## 2021-03-25 VITALS — WEIGHT: 209 LBS | HEIGHT: 67 IN | BODY MASS INDEX: 32.8 KG/M2

## 2021-03-25 DIAGNOSIS — IMO0002 SLEEP-RELATED HYPOVENTILATION: ICD-10-CM

## 2021-03-25 DIAGNOSIS — G47.14 HYPERSOMNIA DUE TO MEDICAL CONDITION: ICD-10-CM

## 2021-03-25 DIAGNOSIS — G47.33 OSA (OBSTRUCTIVE SLEEP APNEA): Primary | ICD-10-CM

## 2021-03-25 PROCEDURE — 99213 OFFICE O/P EST LOW 20 MIN: CPT | Performed by: INTERNAL MEDICINE

## 2021-03-25 PROCEDURE — G0463 HOSPITAL OUTPT CLINIC VISIT: HCPCS

## 2021-03-25 NOTE — PROGRESS NOTES
"Follow Up Sleep Disorders Center Note     Chief Complaint:  MOLINA     Primary Care Physician: Garret Cross MD    Interval History:   The patient is a 75 y.o. female  who I last saw 2021 and that note was reviewed.  At that time, the patient was noted to have a large leak.  Mask care was described to the patient.  The patient reports she is some better.  She does not feel like she wakes up as much as before.  She goes to bed between 9 and 9:30 PM and awakens at 8 AM.  Previously she would use the bathroom 2 or 3 times nightly and presently she only uses it once and rarely a second time.  The patient still has complaints of hypersomnolence with an [place] Sleepiness Scale that is abnormal at 10.    Review of Systems:    A complete review of systems was done and all were negative with the exception of the above    Social History:    Social History     Socioeconomic History   • Marital status:      Spouse name: REJI   • Number of children: 4   • Years of education: Not on file   • Highest education level: Not on file   Tobacco Use   • Smoking status: Former Smoker     Packs/day: 1.50     Years: 6.00     Pack years: 9.00     Types: Cigarettes     Quit date:      Years since quittin.2   • Smokeless tobacco: Never Used   Substance and Sexual Activity   • Alcohol use: No     Comment: No caffeine use   • Drug use: No   • Sexual activity: Not Currently     Partners: Male     Birth control/protection: Post-menopausal       Allergies:  Lipitor [atorvastatin]     Medication Review:  Reviewed.      Vital Signs:    Vitals:    21 0858   Weight: 94.8 kg (209 lb)   Height: 170.2 cm (67\")     Body mass index is 32.73 kg/m².    Physical Exam:    Constitutional:  Well developed 75 y.o. female that appears in no apparent distress.  Awake & oriented times 3.  Normal mood with normal recent and remote memory and normal judgement.  Eyes:  Conjunctivae normal.  Oropharynx: Previously, moist mucous membranes " without exudate and a large tongue and normal uvula and patent posterior pharyngeal opening and class III Mallampati airway, patient is wearing a facemask.     Downloaded PAP Data Reviewed For Compliance:  DME is [facility] and she uses nasal cushions.  Downloads between 12/25 and 3/24/2021 compliance 84%.  Average usage is 4 hours and 12 minutes.  Average AHI is mildly abnormal at 6.3 but all subsets are normal except for partial obstructive index of 6.  The patient still has a large leak.  Average AutoCPAP pressure is 7.9 and her auto CPAP is 7-16.    I have reviewed the above results and compared them with previous results and reviewed with the patient.    Impression:   Mild obstructive sleep apnea, probably underestimated, with sleep-related hypoxia by home sleep study 11/4/2020 adequately treated with auto CPAP with good compliance and usage and some persistent complaints of hypersomnolence.    Plan:  Good sleep hygiene measures should be maintained.  Weight loss would be beneficial in this patient who is obese by BMI.      After evaluating the patient and assessing results available, the patient is benefiting from the treatment being provided.     The patient is clinically better and therefore we will continue auto CPAP as she is doing.  I again reinforced mask care.  A new protocol sent to her DME.    I answered all of the patient's questions.  The patient will call for any problems and will follow up in 8 months.      Yan Quintanilla MD  Sleep Medicine  03/25/21  09:14 EDT

## 2021-04-15 ENCOUNTER — OFFICE VISIT (OUTPATIENT)
Dept: INTERNAL MEDICINE | Facility: CLINIC | Age: 76
End: 2021-04-15

## 2021-04-15 VITALS
RESPIRATION RATE: 16 BRPM | HEIGHT: 67 IN | WEIGHT: 210.8 LBS | HEART RATE: 50 BPM | SYSTOLIC BLOOD PRESSURE: 110 MMHG | BODY MASS INDEX: 33.09 KG/M2 | DIASTOLIC BLOOD PRESSURE: 78 MMHG | TEMPERATURE: 97.3 F | OXYGEN SATURATION: 100 %

## 2021-04-15 DIAGNOSIS — I10 ESSENTIAL HYPERTENSION: ICD-10-CM

## 2021-04-15 DIAGNOSIS — E78.00 HYPERCHOLESTEROLEMIA: Primary | ICD-10-CM

## 2021-04-15 DIAGNOSIS — R06.09 DYSPNEA ON EXERTION: ICD-10-CM

## 2021-04-15 DIAGNOSIS — G62.9 NEUROPATHY: ICD-10-CM

## 2021-04-15 DIAGNOSIS — Z76.89 ENCOUNTER TO ESTABLISH CARE WITH NEW DOCTOR: ICD-10-CM

## 2021-04-15 PROCEDURE — 99215 OFFICE O/P EST HI 40 MIN: CPT | Performed by: FAMILY MEDICINE

## 2021-04-15 RX ORDER — ATORVASTATIN CALCIUM 40 MG/1
40 TABLET, FILM COATED ORAL NIGHTLY
Qty: 90 TABLET | Refills: 3 | Status: SHIPPED | OUTPATIENT
Start: 2021-04-15 | End: 2022-05-27 | Stop reason: SDUPTHER

## 2021-04-15 RX ORDER — AMLODIPINE BESYLATE 5 MG/1
5 TABLET ORAL DAILY
Qty: 90 TABLET | Refills: 3 | Status: SHIPPED | OUTPATIENT
Start: 2021-04-15 | End: 2022-07-14

## 2021-04-15 RX ORDER — EZETIMIBE 10 MG/1
10 TABLET ORAL DAILY
Qty: 90 TABLET | Refills: 3 | Status: SHIPPED | OUTPATIENT
Start: 2021-04-15 | End: 2022-06-28

## 2021-04-15 NOTE — PATIENT INSTRUCTIONS
Zyrtec (cetirizine)  Allegra (fexofenadine)  Claritin (loratidine)    Flonase nasal spray (fluticasone)      Allergic Rhinitis, Adult    Allergic rhinitis is an allergic reaction that affects the mucous membrane inside the nose. The mucous membrane is the tissue that produces mucus.  There are two types of allergic rhinitis:  · Seasonal. This type is also called hay fever and happens only during certain seasons.  · Perennial. This type can happen at any time of the year.  Allergic rhinitis cannot be spread from person to person. This condition can be mild, moderate, or severe. It can develop at any age and may be outgrown.  What are the causes?  This condition is caused by allergens. These are things that can cause an allergic reaction. Allergens may differ for seasonal allergic rhinitis and perennial allergic rhinitis.  · Seasonal allergic rhinitis is triggered by pollen. Pollen can come from grasses, trees, and weeds.  · Perennial allergic rhinitis may be triggered by:  ? Dust mites.  ? Proteins in a pet's urine, saliva, or dander. Dander is dead skin cells from a pet.  ? Smoke, mold, or car fumes.  What increases the risk?  You are more likely to develop this condition if you have a family history of allergies or other conditions related to allergies, including:  · Allergic conjunctivitis.This is inflammation of parts of the eyes and eyelids.  · Asthma. This condition affects the lungs and makes it hard to breathe.  · Atopic dermatitis or eczema. This is long term (chronic ) inflammation of the skin.  · Food allergies  What are the signs or symptoms?  Symptoms of this condition include:  · Sneezing or coughing.  · A stuffy nose (nasal congestion), itchy nose, or nasal discharge.  · Itchy eyes and tearing of the eyes.  · A feeling of mucus dripping down the back of your throat (postnasal drip).  · Trouble sleeping.  · Tiredness or fatigue.  · Headache.  · Sore throat.  How is this diagnosed?  This condition may be  diagnosed with your symptoms, medical history, and physical exam. Your health care provider may check for related conditions, such as:  · Asthma.  · Pink eye. This is eye inflammation caused by infection (conjunctivitis).  · Ear infection.  · Upper respiratory infection. This is an an infection in the nose, throat, or upper airways.  You may also have tests to find out which allergens trigger your symptoms. These may include skin tests or blood tests.  How is this treated?  There is no cure for this condition, but treatment can help control symptoms. Treatment may include:  · Taking medicines that block allergy symptoms, such as corticosteroids and antihistamines. Medicine may be given as a shot, nasal spray, or pill.  · Avoiding any allergens.  · Being exposed again and again to tiny amounts of allergens to help you build a defense against allergens (immunotherapy). This is done if other treatments have not helped. It may include:  ? Allergy shots. These are injected medicines that have small amounts of allergen in them.  ? Sublingual immunotherapy. This involves taking small doses of a medicine with allergen in it under your tongue.  If these treatments do not work, your health care provider may prescribe newer, stronger medicines.  Follow these instructions at home:  Avoiding allergens  Find out what you are allergic to and avoid those allergens. These are some things you can do to help avoid allergens:  · If you have perennial allergies:  ? Replace carpet with wood, tile, or vinyl ileana. Carpet can trap dander and dust.  ? Do not smoke. Do not allow smoking in your home.  ? Change your heating and air conditioning filters at least once a month.  · If you have seasonal allergies, take these steps during allergy season:  ? Keep windows closed as much as possible.  ? Plan outdoor activities when pollen counts are lowest. Check pollen counts before you plan outdoor activities.  ? When coming indoors, change  clothing and shower before sitting on furniture or bedding.  · If you have a pet in the house that produces allergens:  ? Keep the pet out of the bedroom.  ? Vacuum, sweep, and dust regularly.  General instructions  · Take over-the-counter and prescription medicines only as told by your health care provider.  · Drink enough fluid to keep your urine pale yellow.  · Keep all follow-up visits as told by your health care provider. This is important.  Where to find more information  · American Academy of Allergy, Asthma & Immunology: www.aaaai.org  Contact a health care provider if:  · You have a fever.  · You develop a cough that does not go away.  · You make whistling sounds when you breathe (wheeze).  · Your symptoms slow you down or stop you from doing your normal activities each day.  Get help right away if:  · You have shortness of breath.  This symptom may represent a serious problem that is an emergency. Do not wait to see if the symptom will go away. Get medical help right away. Call your local emergency services (911 in the [place] Do not drive yourself to the hospital.  Summary  · Allergic rhinitis may be managed by taking medicines as directed and avoiding allergens.  · If you have seasonal allergies, keep windows closed as much as possible during allergy season.  · Contact your health care provider if you develop a fever or a cough that does not go away.  This information is not intended to replace advice given to you by your health care provider. Make sure you discuss any questions you have with your health care provider.  Document Revised: 01/07/2021 Document Reviewed: 12/15/2020  Elsevier Patient Education © 2021 [employer]

## 2021-04-15 NOTE — PROGRESS NOTES
"Chief Complaint  Establish Care (fasting )    Subjective          Tanay Greenwood presents to [facility]  History of Present Illness     Prior PCP Hugh    Hypertension - stable.  Patient taking medication as prescribed.  Denies chest pain, shortness of breath, headache, lower extremity edema.  Patient is taking amlodipine, metoprolol, irbesartan.    HLD-stable.  Patient taking atorvastatin, Zetia as prescribed.  Trying to adhere to a low-fat diet.    Increasing shortness of breath on exertion after completion of chemo radiation of uterine cancer.      Also was getting gabapentin from a Moon Mcguire at [facility] but says she is not seeing her anymore and says that her last PCP was giving it to her.  I looked through the system and I see no contract, data transmission, or any other evidence that she got it from her PCP.  I stated we can try to contact Ms. Mcguire and get permission to take the gabapentin back over.  PDMP reviewed today and contract completed.  If I get the okay, I will likely only start her back at 300 mg per night for a while due to the sedation and the shortness of breath mentioned above.    She is a renal specialist doing her labs and she said as soon as the kidney specialist does her labs shortly she will send me a copy.    Objective   Vital Signs:   /78 (BP Location: Left arm, Patient Position: Sitting, Cuff Size: Adult)   Pulse 50   Temp 97.3 °F (36.3 °C) (Temporal)   Resp 16   Ht 170.2 cm (67\")   Wt 95.6 kg (210 lb 12.8 oz)   SpO2 100%   BMI 33.02 kg/m²     Physical Exam  Vitals and nursing note reviewed.   Constitutional:       General: She is not in acute distress.     Appearance: Normal appearance.   Cardiovascular:      Rate and Rhythm: Normal rate and regular rhythm.      Heart sounds: Murmur heard.   Systolic murmur is present with a grade of 2/6.     Pulmonary:      Effort: Pulmonary effort is normal.      Breath sounds: " Normal breath sounds.   Musculoskeletal:      Right lower leg: No edema.      Left lower leg: No edema.   Neurological:      Mental Status: She is alert.        Result Review :   The following data was reviewed by: Garret Cross MD on 04/15/2021:      Data reviewed: Consultant notes cardiology notes          Assessment and Plan    Diagnoses and all orders for this visit:    1. Hypercholesterolemia (Primary)  -     ezetimibe (ZETIA) 10 MG tablet; Take 1 tablet by mouth Daily.  Dispense: 90 tablet; Refill: 3  -     atorvastatin (LIPITOR) 40 MG tablet; Take 1 tablet by mouth Every Night.  Dispense: 90 tablet; Refill: 3    2. Essential hypertension  -     amLODIPine (NORVASC) 5 MG tablet; Take 1 tablet by mouth Daily.  Dispense: 90 tablet; Refill: 3    3. Neuropathy    4. Dyspnea on exertion    5. Encounter to establish care with new doctor    Refilled her medications.  It appears her cholesterol and hypertension are under control.  As long as I can get permission from the prior prescriber of the gabapentin, I would be happy to restart her at about 300 mg at night.  I will send a copy of today's note to her cardiology group and see if they want to do anything with the dyspnea on exertion.  It looks like she has been worked up for this some in the past and had a stress test done last year.  She is supposed to be seeing them in 1 month.  It could be due to the chemo, radiation but her lungs and heart today's sounds similar to prior exams by her previous PCP and cardiology.      I spent 40 minutes caring for Tanya on this date of service. This time includes time spent by me in the following activities:preparing for the visit, reviewing tests, obtaining and/or reviewing a separately obtained history, performing a medically appropriate examination and/or evaluation , counseling and educating the patient/family/caregiver, ordering medications, tests, or procedures, referring and communicating with other health care  professionals  and documenting information in the medical record  Follow Up   Return in about 6 months (around 10/15/2021) for Recheck.  Patient was given instructions and counseling regarding her condition or for health maintenance advice. Please see specific information pulled into the AVS if appropriate.

## 2021-05-04 ENCOUNTER — TELEPHONE (OUTPATIENT)
Dept: CARDIOLOGY | Facility: CLINIC | Age: 76
End: 2021-05-04

## 2021-05-04 NOTE — TELEPHONE ENCOUNTER
Received a message from primary care reporting some shortness of breath with activities that patient has been having.  I called and spoke with patient.  She reports that this is chronic, not new or worsening since last visit.  She continues to do her exercise bike routine and denies any decreased stamina or increased shortness of breath with this.  She is overall feeling well.  We will have her keep her upcoming appointment but call sooner for issues or concerns.

## 2021-05-12 ENCOUNTER — OFFICE VISIT (OUTPATIENT)
Dept: CARDIOLOGY | Facility: CLINIC | Age: 76
End: 2021-05-12

## 2021-05-12 ENCOUNTER — TELEPHONE (OUTPATIENT)
Dept: CARDIOLOGY | Facility: CLINIC | Age: 76
End: 2021-05-12

## 2021-05-12 VITALS
DIASTOLIC BLOOD PRESSURE: 74 MMHG | SYSTOLIC BLOOD PRESSURE: 118 MMHG | HEIGHT: 67 IN | WEIGHT: 208 LBS | HEART RATE: 57 BPM | BODY MASS INDEX: 32.65 KG/M2

## 2021-05-12 DIAGNOSIS — I10 ESSENTIAL HYPERTENSION: Primary | Chronic | ICD-10-CM

## 2021-05-12 DIAGNOSIS — I25.10 CORONARY ARTERY DISEASE INVOLVING NATIVE CORONARY ARTERY OF NATIVE HEART WITHOUT ANGINA PECTORIS: ICD-10-CM

## 2021-05-12 PROCEDURE — 93000 ELECTROCARDIOGRAM COMPLETE: CPT | Performed by: NURSE PRACTITIONER

## 2021-05-12 PROCEDURE — 99214 OFFICE O/P EST MOD 30 MIN: CPT | Performed by: NURSE PRACTITIONER

## 2021-05-12 NOTE — PROGRESS NOTES
Date of Office Visit: 2021  Encounter Provider: Joana Christian, ABHIJIT, APRN  Place of Service: [facility] CARDIOLOGY  Patient Name: Tanya Greenwood  :1945        Subjective:     Chief Complaint:  Coronary artery disease, hypertension, chronic shortness of breath with exertion      History of Present Illness:  Tanya Greenwood is a 76 y.o. female patient of Dr. Wright.  I am seeing this patient in the office today and I have reviewed her records.    Patient has a history of coronary artery disease, hypertension, pulmonary hypertension, hyperlipidemia, shortness of breath, fatigue, uterine cancer status post chemotherapy and radiation.     In 2008 patient complained of exertional chest pain in the setting of hypertension.  Echocardiogram showed mild pulmonary hypertension with RVSP of 39 mmHg with normal systolic function and no significant valvular disease.  Cardiac catheterization showed 50% ostial right coronary artery stenosis with 20 to 30% stenosis of the left main, proximal LAD, and circumflex vessels.  She was treated medically.  Follow-up echocardiogram in  showed normal pulmonary pressures.  PET stress test was also negative for ischemia.  2016 she had a treadmill exercise stress test that was negative for ischemia.  She was last seen in the office 10/2019 by Dr. Wright at which point she was staying active without anginal symptoms.  She is was subsequently diagnosed with uterine cancer 2020 and completed chemo and radiation and at 10/2020 follow-up reported that she was cancer free.  She did however have neuropathy which was felt to be from the chemotherapy per patient.  Blood pressure was well controlled.  She reported some shortness of breath and fatigue with exertion though she thought that it was improving with her exercise bike routine.  Echocardiogram 10/2020 showed normal LV systolic function with EF of 57%, grade 1 diastolic dysfunction,  trace tricuspid regurgitation with normal RVSP.  PET stress test 10/2020 was negative for ischemia, considered a low risk study.  Home sleep study 11/2020 did indicate mild sleep apnea as well as some sleep-related hypoxia and sleep medicine appointment was recommended.      Patient presents to office today for follow-up appointment.  Patient reports she is doing well since last visit.  She does continue to have some fatigue with heavy exertion but feels like this is improved with her exercise bike routine.  She is doing 15 minutes 3 times a week.  We discussed increasing this to daily and then trying to slowly increase from there.  And she will notify office of any issues or concerns with this.  She denies any chest pain or discomfort, shortness of breath, shortness of breath with exertion, palpitations, racing heartbeat sensation, dizziness, syncope, falls, fatigue, or abnormal bleeding.  She does get some occasional mild dependent edema though none present on exam today.  Using CPAP nightly.  Blood pressure at home has been staying 120 systolic.        Past Medical History:   Diagnosis Date   • Cancer (CMS/HCC)     uterus   • Chronic renal insufficiency     stage 2 (mild)   • Coronary artery disease    • Coronary atherosclerosis    • Essential hypertension    • Hypercholesterolemia    • MOLINA (obstructive sleep apnea) 11/04/2020    Home sleep study.  Mild MOLINA with AHI 11 events per hour.  Low O2 saturation 80% and sleep-related hypoxia present for 14 minutes.  The patient snored 4% of the total monitoring time.   • Palpitations    • Pulmonary hypertension (CMS/HCC)      Past Surgical History:   Procedure Laterality Date   • HYSTERECTOMY     • JOINT REPLACEMENT     • REPLACEMENT TOTAL KNEE Right 2013     Outpatient Medications Prior to Visit   Medication Sig Dispense Refill   • amLODIPine (NORVASC) 5 MG tablet Take 1 tablet by mouth Daily. 90 tablet 3   • aspirin 81 MG chewable tablet Chew 81 mg Daily.     •  atorvastatin (LIPITOR) 40 MG tablet Take 1 tablet by mouth Every Night. 90 tablet 3   • Cholecalciferol (VITAMIN D-3 PO) Take 2,000 Units by mouth Daily.     • ezetimibe (ZETIA) 10 MG tablet Take 1 tablet by mouth Daily. 90 tablet 3   • ferrous sulfate 325 (65 FE) MG tablet Take 65 mg by mouth Daily With Breakfast.     • furosemide (LASIX) 40 MG tablet Take 40 mg by mouth Daily. 1 po Monday, Wednesday, and Friday     • irbesartan (AVAPRO) 300 MG tablet Take 300 mg by mouth Daily. Unless over 140 then one QD - prn  3   • magnesium oxide (MAGOX) 400 (241.3 Mg) MG tablet tablet Take 400 mg by mouth Daily.     • metoprolol succinate XL (TOPROL-XL) 50 MG 24 hr tablet TAKE 1 TABLET BY MOUTH TWICE DAILY 180 tablet 1   • famotidine (PEPCID) 20 MG tablet Take 20 mg by mouth Daily.     • gabapentin (NEURONTIN) 300 MG capsule Take 300 mg by mouth 2 (two) times a day.       No facility-administered medications prior to visit.       Allergies as of 2021 - Reviewed 2021   Allergen Reaction Noted   • Lipitor [atorvastatin] Other (See Comments) 2018     Social History     Socioeconomic History   • Marital status:      Spouse name: REJI   • Number of children: 4   • Years of education: Not on file   • Highest education level: Not on file   Tobacco Use   • Smoking status: Former Smoker     Packs/day: 1.50     Years: 6.00     Pack years: 9.00     Types: Cigarettes     Quit date:      Years since quittin.3   • Smokeless tobacco: Never Used   Substance and Sexual Activity   • Alcohol use: No     Comment: No caffeine use   • Drug use: No   • Sexual activity: Not Currently     Partners: Male     Birth control/protection: Post-menopausal     Family History   Problem Relation Age of Onset   • Diabetes Mother    • Diabetes Father    • Breast cancer Sister        Review of Systems   Constitutional: Positive for malaise/fatigue. Negative for chills, fever, weight gain and weight loss.   Cardiovascular:         "SEE HPI   Respiratory: Negative for cough, snoring and wheezing.         MOLINA on CPAP   Hematologic/Lymphatic: Negative for bleeding problem. Does not bruise/bleed easily.   Musculoskeletal: Negative for falls, joint pain and myalgias.   Gastrointestinal: Negative for diarrhea, melena, nausea and vomiting.   Genitourinary: Negative for hematuria.   Neurological: Negative for dizziness.   Psychiatric/Behavioral: Negative for altered mental status, depression and substance abuse. The patient is not nervous/anxious.           Objective:     Vitals:    05/12/21 0931   BP: 118/74   BP Location: Left arm   Cuff Size: Adult   Pulse: 57   Weight: 94.3 kg (208 lb)   Height: 170.2 cm (67\")     Body mass index is 32.58 kg/m².      PHYSICAL EXAM:  Constitutional:       General: Not in acute distress.     Appearance: Well-developed. Not diaphoretic.   Eyes:      Pupils: Pupils are equal, round, and reactive to light.   HENT:      Head: Normocephalic and atraumatic.   Neck:      Vascular: No carotid bruit or JVD.   Pulmonary:      Effort: Pulmonary effort is normal. No respiratory distress.      Breath sounds: Normal breath sounds. No wheezing. No rales.   Cardiovascular:      Normal rate. Regular rhythm.      Murmurs: There is no murmur.      No gallop. No click. No rub.   Pulses:     Intact distal pulses.   Edema:     Peripheral edema absent.   Abdominal:      General: Bowel sounds are normal. There is no distension.      Palpations: Abdomen is soft.   Musculoskeletal:         General: No tenderness or deformity.      Cervical back: Neck supple. Skin:     General: Skin is warm and dry.      Findings: No erythema or rash.   Neurological:      Mental Status: Alert and oriented to person, place, and time.   Psychiatric:         Behavior: Behavior normal.         Judgment: Judgment normal.             ECG 12 Lead    Date/Time: 5/12/2021 9:35 AM  Performed by: Joana Christian DNP, APRN  Authorized by: Joana Christian DNP, NAREN "   Comparison: compared with previous ECG from 10/8/2020  Rhythm: sinus rhythm  BPM: 57  Other findings: non-specific ST-T wave changes  Comments: No significant changes from previous EKG              Assessment:       Diagnosis Plan   1. Essential hypertension     2. Coronary artery disease involving native coronary artery of native heart without angina pectoris           Plan:     1. Hypertension: Well-controlled in the office today.  Patient continue to monitor and call for high or low readings.  2. Coronary artery disease: Modest.  With normal stress test 12/20/2016 and 10/20/2020.  3. Deconditioning: She continues to have some fatigue with heavy exertion though feels like she has noticed improvement in this and improvement in her stamina since last visit with her exercise bike routine.  She has had a recent stress test without evidence of ischemia and echo showed normal EF and no pulmonary hypertension or significant valve issues.  We discussed that stress test generally considered about 85 to 90% accurate.  Would recommend trying to do 15 minutes on the exercise bike daily and then slowly increasing from there.  Notify office if symptoms do not continue to improve or if they worsen at which point additional testing may be indicated.  Patient verbalized understanding of importance and will keep us updated.  4. Transient pulmonary hypertension: Not seen on last echo  5. Renal insufficiency: Follows with nephrology  6. Dyslipidemia: PCP managing.  LDL goal less than 70.  7. Uterine cancer: Status post chemo and radiation 6-7/2020.  8. Obstructive sleep apnea: Mild with sleep-related hypoxia.  On CPAP therapy.  Follows with Dr. Quintanilla.  9. Neuropathy: She reports this is felt to be secondary to chemotherapy.    Patient to follow-up with Dr. Wright in 6 months or sooner if needed for any new, recurrent, or worsening symptoms or other issues or concerns.  Discussed in detail signs/symptoms that warrant sooner call or  follow-up to the office or ER visit.      Records reviewed including but not limited to 10/2020 echo, 10/2020 stress test, 11/2020 sleep study, [id number] sleep medicine note.           Your medication list          Accurate as of May 12, 2021  9:46 AM. If you have any questions, ask your nurse or doctor.            CONTINUE taking these medications      Instructions Last Dose Given Next Dose Due   amLODIPine 5 MG tablet  Commonly known as: NORVASC      Take 1 tablet by mouth Daily.       aspirin 81 MG chewable tablet      Chew 81 mg Daily.       atorvastatin 40 MG tablet  Commonly known as: LIPITOR      Take 1 tablet by mouth Every Night.       ezetimibe 10 MG tablet  Commonly known as: ZETIA      Take 1 tablet by mouth Daily.       ferrous sulfate 325 (65 FE) MG tablet      Take 65 mg by mouth Daily With Breakfast.       furosemide 40 MG tablet  Commonly known as: LASIX      Take 40 mg by mouth Daily. 1 po Monday, Wednesday, and Friday       irbesartan 300 MG tablet  Commonly known as: AVAPRO      Take 300 mg by mouth Daily. Unless over 140 then one QD - prn       magnesium oxide 400 (241.3 Mg) MG tablet tablet  Commonly known as: MAGOX      Take 400 mg by mouth Daily.       metoprolol succinate XL 50 MG 24 hr tablet  Commonly known as: TOPROL-XL      TAKE 1 TABLET BY MOUTH TWICE DAILY       VITAMIN D-3 PO      Take 2,000 Units by mouth Daily.              The above medication changes may not have been made by this provider.  Patient's medication list was updated to reflect medications they are currently taking including medication changes made by other providers.            Thanks,    Joana Christian, DNP, APRN  05/12/2021         Dictated utilizing Dragon dictation

## 2021-06-01 NOTE — TELEPHONE ENCOUNTER
Looks like she had Taxol/carboplatin x6 cycles completed 6/2020.  Also with 4 fractions HDR vaginal cuff brachytherapy 8/2020.      Dr. Wright--- any additional follow-up indicated from a cardio oncology standpoint based on the above?

## 2021-06-03 ENCOUNTER — APPOINTMENT (OUTPATIENT)
Dept: SLEEP MEDICINE | Facility: HOSPITAL | Age: 76
End: 2021-06-03

## 2021-06-04 LAB
AORTIC ARCH: 3 CM
ASCENDING AORTA: 3.3 CM
BH CV ECHO MEAS - ACS: 1.6 CM
BH CV ECHO MEAS - AO ARCH DIAM (PROXIMAL TRANS.): 3 CM
BH CV ECHO MEAS - AO MAX PG (FULL): 8.9 MMHG
BH CV ECHO MEAS - AO MAX PG: 14.3 MMHG
BH CV ECHO MEAS - AO MEAN PG (FULL): 4.6 MMHG
BH CV ECHO MEAS - AO MEAN PG: 7.4 MMHG
BH CV ECHO MEAS - AO ROOT AREA (BSA CORRECTED): 1.6
BH CV ECHO MEAS - AO ROOT AREA: 8 CM^2
BH CV ECHO MEAS - AO ROOT DIAM: 3.2 CM
BH CV ECHO MEAS - AO V2 MAX: 189.3 CM/SEC
BH CV ECHO MEAS - AO V2 MEAN: 128 CM/SEC
BH CV ECHO MEAS - AO V2 VTI: 43.3 CM
BH CV ECHO MEAS - ASC AORTA: 3.3 CM
BH CV ECHO MEAS - AVA(I,A): 1.9 CM^2
BH CV ECHO MEAS - AVA(I,D): 1.9 CM^2
BH CV ECHO MEAS - AVA(V,A): 1.9 CM^2
BH CV ECHO MEAS - AVA(V,D): 1.9 CM^2
BH CV ECHO MEAS - BSA(HAYCOCK): 2.1 M^2
BH CV ECHO MEAS - BSA: 2 M^2
BH CV ECHO MEAS - BZI_BMI: 31.8 KILOGRAMS/M^2
BH CV ECHO MEAS - BZI_METRIC_HEIGHT: 170.2 CM
BH CV ECHO MEAS - BZI_METRIC_WEIGHT: 92.1 KG
BH CV ECHO MEAS - EDV(MOD-SP2): 44 ML
BH CV ECHO MEAS - EDV(MOD-SP4): 41 ML
BH CV ECHO MEAS - EDV(TEICH): 31.6 ML
BH CV ECHO MEAS - EF(CUBED): 68 %
BH CV ECHO MEAS - EF(MOD-BP): 56.9 %
BH CV ECHO MEAS - EF(MOD-SP2): 68.2 %
BH CV ECHO MEAS - EF(MOD-SP4): 41.5 %
BH CV ECHO MEAS - EF(TEICH): 61.3 %
BH CV ECHO MEAS - ESV(MOD-SP2): 14 ML
BH CV ECHO MEAS - ESV(MOD-SP4): 24 ML
BH CV ECHO MEAS - ESV(TEICH): 12.2 ML
BH CV ECHO MEAS - FS: 31.6 %
BH CV ECHO MEAS - IVS/LVPW: 1.1
BH CV ECHO MEAS - IVSD: 1.1 CM
BH CV ECHO MEAS - LAT PEAK E' VEL: 12.3 CM/SEC
BH CV ECHO MEAS - LV DIASTOLIC VOL/BSA (35-75): 20.1 ML/M^2
BH CV ECHO MEAS - LV MASS(C)D: 80.6 GRAMS
BH CV ECHO MEAS - LV MASS(C)DI: 39.6 GRAMS/M^2
BH CV ECHO MEAS - LV MAX PG: 5.5 MMHG
BH CV ECHO MEAS - LV MEAN PG: 2.8 MMHG
BH CV ECHO MEAS - LV SYSTOLIC VOL/BSA (12-30): 11.8 ML/M^2
BH CV ECHO MEAS - LV V1 MAX: 117 CM/SEC
BH CV ECHO MEAS - LV V1 MEAN: 79.1 CM/SEC
BH CV ECHO MEAS - LV V1 VTI: 26.7 CM
BH CV ECHO MEAS - LVIDD: 2.9 CM
BH CV ECHO MEAS - LVIDS: 2 CM
BH CV ECHO MEAS - LVLD AP2: 7.3 CM
BH CV ECHO MEAS - LVLD AP4: 7 CM
BH CV ECHO MEAS - LVLS AP2: 5.6 CM
BH CV ECHO MEAS - LVLS AP4: 6 CM
BH CV ECHO MEAS - LVOT AREA (M): 3.1 CM^2
BH CV ECHO MEAS - LVOT AREA: 3 CM^2
BH CV ECHO MEAS - LVOT DIAM: 2 CM
BH CV ECHO MEAS - LVPWD: 0.97 CM
BH CV ECHO MEAS - MED PEAK E' VEL: 9.1 CM/SEC
BH CV ECHO MEAS - MR MAX PG: 16.9 MMHG
BH CV ECHO MEAS - MR MAX VEL: 205.5 CM/SEC
BH CV ECHO MEAS - MV A DUR: 0.11 SEC
BH CV ECHO MEAS - MV A MAX VEL: 90.4 CM/SEC
BH CV ECHO MEAS - MV DEC SLOPE: 209.4 CM/SEC^2
BH CV ECHO MEAS - MV DEC TIME: 0.22 SEC
BH CV ECHO MEAS - MV E MAX VEL: 66 CM/SEC
BH CV ECHO MEAS - MV E/A: 0.73
BH CV ECHO MEAS - MV MAX PG: 3 MMHG
BH CV ECHO MEAS - MV MEAN PG: 1.1 MMHG
BH CV ECHO MEAS - MV P1/2T MAX VEL: 65.9 CM/SEC
BH CV ECHO MEAS - MV P1/2T: 92.2 MSEC
BH CV ECHO MEAS - MV V2 MAX: 86.1 CM/SEC
BH CV ECHO MEAS - MV V2 MEAN: 48.4 CM/SEC
BH CV ECHO MEAS - MV V2 VTI: 26.3 CM
BH CV ECHO MEAS - MVA P1/2T LCG: 3.3 CM^2
BH CV ECHO MEAS - MVA(P1/2T): 2.4 CM^2
BH CV ECHO MEAS - MVA(VTI): 3.1 CM^2
BH CV ECHO MEAS - PA ACC TIME: 0.11 SEC
BH CV ECHO MEAS - PA MAX PG (FULL): 0.71 MMHG
BH CV ECHO MEAS - PA MAX PG: 2.6 MMHG
BH CV ECHO MEAS - PA PR(ACCEL): 29.1 MMHG
BH CV ECHO MEAS - PA V2 MAX: 80.8 CM/SEC
BH CV ECHO MEAS - PULM A REVS DUR: 0.1 SEC
BH CV ECHO MEAS - PULM A REVS VEL: 44.6 CM/SEC
BH CV ECHO MEAS - PULM DIAS VEL: 33.4 CM/SEC
BH CV ECHO MEAS - PULM S/D: 1.6
BH CV ECHO MEAS - PULM SYS VEL: 53.6 CM/SEC
BH CV ECHO MEAS - RAP SYSTOLE: 3 MMHG
BH CV ECHO MEAS - RV MAX PG: 1.9 MMHG
BH CV ECHO MEAS - RV MEAN PG: 0.96 MMHG
BH CV ECHO MEAS - RV V1 MAX: 69 CM/SEC
BH CV ECHO MEAS - RV V1 MEAN: 46.3 CM/SEC
BH CV ECHO MEAS - RV V1 VTI: 16.7 CM
BH CV ECHO MEAS - RVSP: 26 MMHG
BH CV ECHO MEAS - SI(AO): 169.5 ML/M^2
BH CV ECHO MEAS - SI(CUBED): 8 ML/M^2
BH CV ECHO MEAS - SI(LVOT): 39.8 ML/M^2
BH CV ECHO MEAS - SI(MOD-SP2): 14.7 ML/M^2
BH CV ECHO MEAS - SI(MOD-SP4): 8.4 ML/M^2
BH CV ECHO MEAS - SI(TEICH): 9.5 ML/M^2
BH CV ECHO MEAS - SUP REN AO DIAM: 3.4 CM
BH CV ECHO MEAS - SV(AO): 344.8 ML
BH CV ECHO MEAS - SV(CUBED): 16.2 ML
BH CV ECHO MEAS - SV(LVOT): 81 ML
BH CV ECHO MEAS - SV(MOD-SP2): 30 ML
BH CV ECHO MEAS - SV(MOD-SP4): 17 ML
BH CV ECHO MEAS - SV(TEICH): 19.4 ML
BH CV ECHO MEAS - TAPSE (>1.6): 1.9 CM
BH CV ECHO MEAS - TR MAX VEL: 238.9 CM/SEC
BH CV ECHO MEASUREMENTS AVERAGE E/E' RATIO: 6.17
BH CV XLRA - RV BASE: 2.8 CM
BH CV XLRA - RV LENGTH: 4.6 CM
BH CV XLRA - RV MID: 2 CM
BH CV XLRA - TDI S': 9.7 CM/SEC
LEFT ATRIUM VOLUME INDEX: 23 ML/M2
LV EF 2D ECHO EST: 57 %
MAXIMAL PREDICTED HEART RATE: 146 BPM
SINUS: 2.8 CM
STJ: 2.6 CM
STRESS TARGET HR: 124 BPM

## 2021-10-08 ENCOUNTER — TELEPHONE (OUTPATIENT)
Dept: INTERNAL MEDICINE | Facility: CLINIC | Age: 76
End: 2021-10-08

## 2021-10-08 NOTE — TELEPHONE ENCOUNTER
PATIENT CALLED TO RESCHEDULE APPT, STATES SHE HAS TO HAVE MORNING. NONE WERE AVAILABLE, ATTEMPTED TO WARM TRANSFER.

## 2021-11-15 ENCOUNTER — OFFICE VISIT (OUTPATIENT)
Dept: INTERNAL MEDICINE | Facility: CLINIC | Age: 76
End: 2021-11-15

## 2021-11-15 VITALS
SYSTOLIC BLOOD PRESSURE: 110 MMHG | BODY MASS INDEX: 33.3 KG/M2 | HEIGHT: 67 IN | RESPIRATION RATE: 16 BRPM | DIASTOLIC BLOOD PRESSURE: 82 MMHG | WEIGHT: 212.2 LBS | HEART RATE: 54 BPM | OXYGEN SATURATION: 100 % | TEMPERATURE: 97.1 F

## 2021-11-15 DIAGNOSIS — H69.91 DISORDER OF RIGHT EUSTACHIAN TUBE: ICD-10-CM

## 2021-11-15 DIAGNOSIS — I10 ESSENTIAL HYPERTENSION: Primary | ICD-10-CM

## 2021-11-15 DIAGNOSIS — E78.00 HYPERCHOLESTEROLEMIA: ICD-10-CM

## 2021-11-15 PROBLEM — G62.0 DRUG-INDUCED POLYNEUROPATHY (HCC): Status: ACTIVE | Noted: 2021-08-09

## 2021-11-15 PROBLEM — C54.9: Status: ACTIVE | Noted: 2021-08-06

## 2021-11-15 PROCEDURE — 99214 OFFICE O/P EST MOD 30 MIN: CPT | Performed by: FAMILY MEDICINE

## 2021-11-15 RX ORDER — HYDRALAZINE HYDROCHLORIDE 50 MG/1
50 TABLET, FILM COATED ORAL 2 TIMES DAILY
COMMUNITY
Start: 2021-10-11

## 2021-11-15 RX ORDER — METHYLPREDNISOLONE 4 MG/1
TABLET ORAL
Qty: 21 EACH | Refills: 0 | Status: SHIPPED | OUTPATIENT
Start: 2021-11-15 | End: 2021-12-09

## 2021-11-15 RX ORDER — GABAPENTIN 300 MG/1
300 CAPSULE ORAL 2 TIMES DAILY
COMMUNITY
Start: 2021-08-09 | End: 2022-10-26

## 2021-11-15 NOTE — PROGRESS NOTES
"Chief Complaint  Hyperlipidemia (6 month follow up)    Subjective          Tanya Greenwood presents to [facility]  History of Present Illness     Hypertension - stable.  Patient taking medication as prescribed.  Denies chest pain, shortness of breath, headache, lower extremity edema.  Patient is taking amlodipine, metoprolol, irbesartan.     HLD-stable.  Patient taking atorvastatin, Zetia as prescribed.  Trying to adhere to a low-fat diet.    Sees a renal specialist for chronic kidney disease.      Right ear sounding like a waterfall at night.  She says it is most noticeable when laying down for the evening to sleep.  She does not notice it during the daytime.  No drainage from the ear or pain.  No appreciable hearing loss.  Going on \"for awhile.\"  Makes it difficult to sleep sometimes.    Objective   Vital Signs:   /82 (BP Location: Left arm, Patient Position: Sitting, Cuff Size: Adult)   Pulse 54   Temp 97.1 °F (36.2 °C) (Temporal)   Resp 16   Ht 170.2 cm (67\")   Wt 96.3 kg (212 lb 3.2 oz)   SpO2 100%   BMI 33.24 kg/m²     Physical Exam  Vitals and nursing note reviewed.   Constitutional:       General: She is not in acute distress.     Appearance: Normal appearance.   HENT:      Right Ear: Tympanic membrane, ear canal and external ear normal. There is no impacted cerumen.      Left Ear: Tympanic membrane, ear canal and external ear normal. There is no impacted cerumen.   Cardiovascular:      Rate and Rhythm: Normal rate and regular rhythm.      Heart sounds: Normal heart sounds. No murmur heard.      Pulmonary:      Effort: Pulmonary effort is normal.      Breath sounds: Normal breath sounds.   Neurological:      Mental Status: She is alert.        Result Review :   The following data was reviewed by: Garret Cross MD on 11/15/2021:                Assessment and Plan    Diagnoses and all orders for this visit:    1. Essential hypertension (Primary)    2. " Hypercholesterolemia  -     Lipid Panel With LDL / HDL Ratio; Future    3. Disorder of right eustachian tube  -     methylPREDNISolone (MEDROL) 4 MG dose pack; Take as directed on package instructions.  Dispense: 21 each; Refill: 0    Blood pressure is stable, continue current medications.  I will check labs for the stability of the hypercholesterolemia.  Continue current medications.    I am not entirely sure what to make of the ocean sound that she is getting with her ear.  It does not sound like tinnitus as it is not happening during the daytime but only when she lays down for sleep.  I am thinking she is having more of a eustachian tube disorder.  I will try a Medrol Dosepak to see if that helps.  If it does not help, I told her I may refer her to ENT for further evaluation.        Follow Up   Return in about 6 months (around 5/31/2022) for Medicare Wellness.  Patient was given instructions and counseling regarding her condition or for health maintenance advice. Please see specific information pulled into the AVS if appropriate.

## 2021-11-29 ENCOUNTER — TELEPHONE (OUTPATIENT)
Dept: INTERNAL MEDICINE | Facility: CLINIC | Age: 76
End: 2021-11-29

## 2021-11-29 NOTE — TELEPHONE ENCOUNTER
Hub staff attempted to follow warm transfer process and was unsuccessful     Caller: Tanya Greenwood    Relationship to patient: Self    Best call back number: [phone] (M)    Patient is needing: NEEDS TO RESCHEDULE LAB APPOINTMENT FOR TODAY.        THANKS

## 2021-12-01 ENCOUNTER — APPOINTMENT (OUTPATIENT)
Dept: SLEEP MEDICINE | Facility: HOSPITAL | Age: 76
End: 2021-12-01

## 2021-12-09 ENCOUNTER — OFFICE VISIT (OUTPATIENT)
Dept: CARDIOLOGY | Facility: CLINIC | Age: 76
End: 2021-12-09

## 2021-12-09 VITALS
HEART RATE: 59 BPM | SYSTOLIC BLOOD PRESSURE: 130 MMHG | BODY MASS INDEX: 33.49 KG/M2 | HEIGHT: 67 IN | DIASTOLIC BLOOD PRESSURE: 80 MMHG | WEIGHT: 213.4 LBS

## 2021-12-09 DIAGNOSIS — I25.10 CORONARY ARTERY DISEASE INVOLVING NATIVE CORONARY ARTERY OF NATIVE HEART WITHOUT ANGINA PECTORIS: ICD-10-CM

## 2021-12-09 DIAGNOSIS — E78.00 HYPERCHOLESTEROLEMIA: Chronic | ICD-10-CM

## 2021-12-09 DIAGNOSIS — I10 ESSENTIAL HYPERTENSION: Primary | ICD-10-CM

## 2021-12-09 DIAGNOSIS — G47.33 OSA (OBSTRUCTIVE SLEEP APNEA): ICD-10-CM

## 2021-12-09 DIAGNOSIS — C54.9 CARCINOSARCOMA OF BODY OF UTERUS (HCC): ICD-10-CM

## 2021-12-09 DIAGNOSIS — Z13.6 ENCOUNTER FOR SCREENING FOR VASCULAR DISEASE: ICD-10-CM

## 2021-12-09 PROCEDURE — 99213 OFFICE O/P EST LOW 20 MIN: CPT | Performed by: INTERNAL MEDICINE

## 2021-12-09 PROCEDURE — 93000 ELECTROCARDIOGRAM COMPLETE: CPT | Performed by: INTERNAL MEDICINE

## 2021-12-09 NOTE — PROGRESS NOTES
Date of Office Visit: 2021  Encounter Provider: Concepcion Wright MD  Place of Service: [facility] CARDIOLOGY  Patient Name: Tanya Greenwood  :1945    Chief complaint  Modest coronary artery disease, pulmonary hypertension    History of Present Illness  The patient is a 74-year-old female with history of hypertension and hyperlipidemia who in 2008 presented with exertional chest pain in the setting of hypertension. An echocardiogram revealed mild pulmonary hypertension with an RVSP of 39 mmHg with normal systolic function and no significant valvular disease. Cardiac catheterization revealed a 50% ostial right coronary artery stenosis with 20-30% stenosis of the left main, proximal LAD and circumflex vessels. She was treated medically.  Follow-up echocardiogram in 10/2020 showed normal systolic function normal GLS -22.5% with grade 1 diastolic dysfunction trivial mitral tricuspid regurgitation normal right-sided pressures.  Stress perfusion study was negative for ischemia was a low risk study.    Since last visit she is using CPAP consistently up until last week when she read about the recall she has not used it in a week.  She is exercising daily by walking or riding a bicycle for 20 minutes at a time.  Has had no palpitations dizziness chest pain she has dyspnea on exertion is unchanged she wears JULISSA hose to control edema.  Blood work 2020 includes creatinine 1.84 with GFR 30, CBC unremarkable.  No recent lipids on file    Past Medical History:   Diagnosis Date   • Cancer (HCC)     uterus   • Chronic renal insufficiency     stage 2 (mild)   • Coronary artery disease    • Coronary atherosclerosis    • Essential hypertension    • Hypercholesterolemia    • MOLINA (obstructive sleep apnea) 2020    Home sleep study.  Mild MOLINA with AHI 11 events per hour.  Low O2 saturation 80% and sleep-related hypoxia present for 14 minutes.  The patient snored 4% of the total  monitoring time.   • Palpitations    • Pulmonary hypertension (HCC)      Past Surgical History:   Procedure Laterality Date   • HYSTERECTOMY     • JOINT REPLACEMENT     • REPLACEMENT TOTAL KNEE Right 2013     Outpatient Medications Prior to Visit   Medication Sig Dispense Refill   • amLODIPine (NORVASC) 5 MG tablet Take 1 tablet by mouth Daily. 90 tablet 3   • aspirin 81 MG chewable tablet Chew 81 mg Daily.     • atorvastatin (LIPITOR) 40 MG tablet Take 1 tablet by mouth Every Night. 90 tablet 3   • Cholecalciferol (VITAMIN D-3 PO) Take 2,000 Units by mouth Daily.     • ezetimibe (ZETIA) 10 MG tablet Take 1 tablet by mouth Daily. 90 tablet 3   • ferrous sulfate 325 (65 FE) MG tablet Take 65 mg by mouth Daily With Breakfast.     • furosemide (LASIX) 40 MG tablet Take 40 mg by mouth Daily. 1 po Monday, Wednesday, and Friday     • gabapentin (NEURONTIN) 300 MG capsule Take 300 mg by mouth 2 (Two) Times a Day.     • hydrALAZINE (APRESOLINE) 50 MG tablet Take 50 mg by mouth 2 (Two) Times a Day.     • magnesium oxide (MAGOX) 400 (241.3 Mg) MG tablet tablet Take 400 mg by mouth Daily.     • metoprolol succinate XL (TOPROL-XL) 50 MG 24 hr tablet TAKE 1 TABLET BY MOUTH TWICE DAILY 180 tablet 1   • irbesartan (AVAPRO) 300 MG tablet Take 300 mg by mouth Daily. Unless over 140 then one QD - prn  3   • methylPREDNISolone (MEDROL) 4 MG dose pack Take as directed on package instructions. 21 each 0     No facility-administered medications prior to visit.       Allergies as of 2021 - Reviewed 2021   Allergen Reaction Noted   • Lipitor [atorvastatin] Other (See Comments) 2018     Social History     Socioeconomic History   • Marital status:      Spouse name: REJI   • Number of children: 4   Tobacco Use   • Smoking status: Former Smoker     Packs/day: 1.50     Years: 6.00     Pack years: 9.00     Types: Cigarettes     Quit date:      Years since quittin.9   • Smokeless tobacco: Never Used   Substance  "and Sexual Activity   • Alcohol use: No     Comment: No caffeine use   • Drug use: No   • Sexual activity: Not Currently     Partners: Male     Birth control/protection: Post-menopausal     Family History   Problem Relation Age of Onset   • Diabetes Mother    • Diabetes Father    • Breast cancer Sister      Review of Systems   Constitutional: Negative for chills, fever, weight gain and weight loss.   Cardiovascular: Negative for leg swelling.   Respiratory: Negative for cough, snoring and wheezing.    Hematologic/Lymphatic: Negative for bleeding problem. Does not bruise/bleed easily.   Skin: Negative for color change.   Musculoskeletal: Positive for joint pain and myalgias. Negative for falls.   Gastrointestinal: Negative for melena.   Genitourinary: Negative for hematuria.   Neurological: Positive for excessive daytime sleepiness.   Psychiatric/Behavioral: Negative for depression. The patient is not nervous/anxious.         Objective:     Vitals:    12/09/21 1006   BP: 130/80   Pulse: 59   Weight: 96.8 kg (213 lb 6.4 oz)   Height: 170.2 cm (67\")     Body mass index is 33.42 kg/m².    Vitals reviewed.   Constitutional:       Appearance: Well-developed. Obese.   Eyes:      General: No scleral icterus.        Right eye: No discharge.      Conjunctiva/sclera: Conjunctivae normal.      Pupils: Pupils are equal, round, and reactive to light.   HENT:      Head: Normocephalic.      Nose: Nose normal.   Neck:      Thyroid: No thyromegaly.      Vascular: No JVD.   Pulmonary:      Effort: Pulmonary effort is normal. No respiratory distress.      Breath sounds: Normal breath sounds. No wheezing. No rales.   Cardiovascular:      Normal rate. Regular rhythm. Normal S1. Normal S2.      Murmurs: There is no murmur.      No gallop.   Pulses:     Intact distal pulses.   Edema:     Peripheral edema present.     Ankle: bilateral trace edema of the ankle.     Feet: bilateral edema of the feet.  Abdominal:      General: Bowel sounds are " normal. There is no distension.      Palpations: Abdomen is soft.      Tenderness: There is no abdominal tenderness. There is no rebound.   Musculoskeletal: Normal range of motion.         General: No tenderness.      Cervical back: Normal range of motion and neck supple. Skin:     General: Skin is warm and dry.      Findings: No erythema or rash.   Neurological:      Mental Status: Alert and oriented to person, place, and time.   Psychiatric:         Behavior: Behavior normal.         Thought Content: Thought content normal.         Judgment: Judgment normal.       Lab Review:     ECG 12 Lead    Date/Time: 12/9/2021 10:07 AM  Performed by: Concepcion Wright MD  Authorized by: Concepcion Wright MD   Comparison: compared with previous ECG   Comparison to previous ECG: PVCs are present  Rhythm: sinus rhythm  Other findings: non-specific ST-T wave changes  Other findings comments: Early repolarization pattern    Clinical impression: abnormal EKG          Assessment:       Diagnosis Plan   1. Essential hypertension  ECG 12 Lead   2. Coronary artery disease involving native coronary artery of native heart without angina pectoris  ECG 12 Lead   3. Hypercholesterolemia     4. Encounter for screening for vascular disease     5. Carcinosarcoma of body of uterus (HCC)     6. MOLINA (obstructive sleep apnea) treated with auto CPAP       Plan:       1.  Coronary artery disease, modest with no ischemia on stress test 10/2020  2.  Hypertension.  Blood pressure at home is lower than it is today.  3.  Dyslipidemia. Aim for goal LDL < 70, HDL greater than 50 triglyceride assessment 60.  She has labs coming up.  4.  History of transient pulmonary hypertension.  Normal pressures by echo 10/2020  5.  Renal insufficiency, labs stable with creatinine slightly worse  6.  Asymptomatic bradycardia.  Unchanged.  7.  Elevated glucose, low carb diet again recommended  8.  Atherosclerosis in the aorta noted on vascular screening study in September 2018.  No  significant obstructive disease in the carotids in the abdomen and the legs on the screening study.  9.  Sleep apnea.  I have asked her to contact Dr. Quintanilla to determine if she is eligible for a filter and continue use of CPAP until she gets a new device.  She will contact him today.  10.  PVCs.  Likely due to untreated sleep apnea over the past week.  Observe for now    Time Spent: I spent 25 minutes caring for Tanya on this date of service. This time includes time spent by me in the following activities: preparing for the visit, reviewing tests, obtaining and/or reviewing a separately obtained history, performing a medically appropriate examination and/or evaluation, counseling and educating the patient/family/caregiver, documenting information in the medical record and independently interpreting results and communicating that information with the patient/family/caregiver.   I spent 1 minutes on the separately reported service of ECG. This time is not included in the time used to support the E/M service also reported today.        Your medication list          Accurate as of December 9, 2021 11:59 PM. If you have any questions, ask your nurse or doctor.            CONTINUE taking these medications      Instructions Last Dose Given Next Dose Due   amLODIPine 5 MG tablet  Commonly known as: NORVASC      Take 1 tablet by mouth Daily.       aspirin 81 MG chewable tablet      Chew 81 mg Daily.       atorvastatin 40 MG tablet  Commonly known as: LIPITOR      Take 1 tablet by mouth Every Night.       ezetimibe 10 MG tablet  Commonly known as: ZETIA      Take 1 tablet by mouth Daily.       ferrous sulfate 325 (65 FE) MG tablet      Take 65 mg by mouth Daily With Breakfast.       furosemide 40 MG tablet  Commonly known as: LASIX      Take 40 mg by mouth Daily. 1 po Monday, Wednesday, and Friday       gabapentin 300 MG capsule  Commonly known as: NEURONTIN      Take 300 mg by mouth 2 (Two) Times a Day.       hydrALAZINE  50 MG tablet  Commonly known as: APRESOLINE      Take 50 mg by mouth 2 (Two) Times a Day.       magnesium oxide 400 (241.3 Mg) MG tablet tablet  Commonly known as: MAGOX      Take 400 mg by mouth Daily.       metoprolol succinate XL 50 MG 24 hr tablet  Commonly known as: TOPROL-XL      TAKE 1 TABLET BY MOUTH TWICE DAILY       VITAMIN D-3 PO      Take 2,000 Units by mouth Daily.          STOP taking these medications    irbesartan 300 MG tablet  Commonly known as: AVAPRO  Stopped by: Concepcion Wright MD        methylPREDNISolone 4 MG dose pack  Commonly known as: MEDROL  Stopped by: Concepcion Wright MD               Patient is no longer taking -.  I corrected the med list to reflect this.  I did not stop these medications.      Dictated utilizing Dragon dictation

## 2022-01-18 RX ORDER — METOPROLOL SUCCINATE 50 MG/1
TABLET, EXTENDED RELEASE ORAL
Qty: 180 TABLET | Refills: 1 | Status: SHIPPED | OUTPATIENT
Start: 2022-01-18 | End: 2022-08-22

## 2022-02-09 ENCOUNTER — APPOINTMENT (OUTPATIENT)
Dept: SLEEP MEDICINE | Facility: HOSPITAL | Age: 77
End: 2022-02-09

## 2022-04-07 ENCOUNTER — APPOINTMENT (OUTPATIENT)
Dept: SLEEP MEDICINE | Facility: HOSPITAL | Age: 77
End: 2022-04-07

## 2022-04-14 ENCOUNTER — APPOINTMENT (OUTPATIENT)
Dept: SLEEP MEDICINE | Facility: HOSPITAL | Age: 77
End: 2022-04-14

## 2022-05-04 ENCOUNTER — OFFICE VISIT (OUTPATIENT)
Dept: SLEEP MEDICINE | Facility: HOSPITAL | Age: 77
End: 2022-05-04

## 2022-05-04 VITALS — OXYGEN SATURATION: 99 % | WEIGHT: 207 LBS | HEART RATE: 77 BPM | BODY MASS INDEX: 32.49 KG/M2 | HEIGHT: 67 IN

## 2022-05-04 DIAGNOSIS — G47.36 HYPOXEMIA ASSOCIATED WITH SLEEP: ICD-10-CM

## 2022-05-04 DIAGNOSIS — G47.33 OSA (OBSTRUCTIVE SLEEP APNEA): Primary | ICD-10-CM

## 2022-05-04 PROCEDURE — G0463 HOSPITAL OUTPT CLINIC VISIT: HCPCS

## 2022-05-04 PROCEDURE — 99213 OFFICE O/P EST LOW 20 MIN: CPT | Performed by: INTERNAL MEDICINE

## 2022-05-04 NOTE — PROGRESS NOTES
"[facility] Note     Chief Complaint:  MOLINA     Primary Care Physician: Garret Cross MD    Interval History:   The patient is a 77 y.o. female  who I last saw 3/25/2021 and that note was reviewed.  The patient reports she is improved?  However, she has not used her auto CPAP since 2021 due to the [employer] recall.  The patient goes to bed around 9:30 PM and awakens at 8 AM.  He does use the restroom during the nighttime.    Self-administered [place] Sleepiness Scale test results: 5  0-5 Lower normal daytime sleepiness  6-10 Higher normal daytime sleepiness  11-12 Mild, 13-15 Moderate, & 16-24 Severe excessive daytime sleepiness    Review of Systems:    A complete review of systems was done and all were negative with the exception of occasional cough    Social History:    Social History     Socioeconomic History   • Marital status:      Spouse name: REJI   • Number of children: 4   Tobacco Use   • Smoking status: Former Smoker     Packs/day: 1.50     Years: 6.00     Pack years: 9.00     Types: Cigarettes     Quit date:      Years since quittin.3   • Smokeless tobacco: Never Used   Substance and Sexual Activity   • Alcohol use: No     Comment: No caffeine use   • Drug use: No   • Sexual activity: Not Currently     Partners: Male     Birth control/protection: Post-menopausal       Allergies:  Lipitor [atorvastatin]     Medication Review:  Reviewed.      Vital Signs:    Vitals:    22 1119   Pulse: 77   SpO2: 99%   Weight: 93.9 kg (207 lb)   Height: 170.2 cm (67\")     Body mass index is 32.42 kg/m².    Physical Exam:    Constitutional:  Well developed 77 y.o. female that appears in no apparent distress.  Awake & oriented times 3.  Normal mood with normal recent and remote memory and normal judgement.  Eyes:  Conjunctivae normal.  Oropharynx: Previously, moist mucous membranes without exudate and a large tongue and normal uvula and patent posterior pharyngeal opening " and class III Mallampati airway, patient is wearing a facemask.     Downloaded PAP Data Reviewed For Compliance:  DME is [employer] and she uses nasal cushion.  Downloads between 820 1/11/1821 compliance only 40%.  Average usage is 4 hours and 13 minutes.  Average AHI is abnormal at 13.6 and all indices are normal except for her hypopnea index and she does have a large leak of 3 hours and 4 minutes.  Average auto CPAP pressure is 7 and her auto CPAP is 7-16.    I have reviewed the above results and compared them with the patient's last downloads and reviewed with the patient.    Impression:   Mild obstructive sleep apnea, probably underestimated, with sleep-related hypoxia by home sleep study 11/4/2020  previously adequately treated with auto CPAP.  Presently, the patient denies complaints of hypersomnolence.    Plan:  Good sleep hygiene measures should be maintained.  Weight loss would be beneficial in this patient who is obese by BMI.      After evaluating the patient and assessing results available, the patient is benefiting from the treatment being provided.     The patient will continue auto CPAP.  After clinical evaluation and review of downloads, I recommend no changes to the patient's pressures.      The patient reports she has registered her device.  I reviewed [facility] updated recall information from the first of the year.  Due to the patient's sleep-related hypoxia, I would recommend restarting auto CPAP as before.  I answered all of her questions.    I answered all of the patient's questions.  The patient will call for any problems and will follow up in 2-3 months.      Yan Quintanilla MD  Sleep Medicine  05/04/22  11:31 EDT

## 2022-05-11 PROBLEM — G47.36 HYPOXEMIA ASSOCIATED WITH SLEEP: Status: ACTIVE | Noted: 2020-11-28

## 2022-05-27 DIAGNOSIS — E78.00 HYPERCHOLESTEROLEMIA: ICD-10-CM

## 2022-05-27 RX ORDER — ATORVASTATIN CALCIUM 40 MG/1
40 TABLET, FILM COATED ORAL NIGHTLY
Qty: 90 TABLET | Refills: 0 | Status: SHIPPED | OUTPATIENT
Start: 2022-05-27 | End: 2022-10-07 | Stop reason: SDUPTHER

## 2022-06-03 ENCOUNTER — OFFICE VISIT (OUTPATIENT)
Dept: INTERNAL MEDICINE | Facility: CLINIC | Age: 77
End: 2022-06-03

## 2022-06-03 VITALS
DIASTOLIC BLOOD PRESSURE: 78 MMHG | HEART RATE: 67 BPM | SYSTOLIC BLOOD PRESSURE: 128 MMHG | BODY MASS INDEX: 31.86 KG/M2 | OXYGEN SATURATION: 99 % | TEMPERATURE: 97.8 F | HEIGHT: 67 IN | WEIGHT: 203 LBS

## 2022-06-03 DIAGNOSIS — Z12.31 ENCOUNTER FOR SCREENING MAMMOGRAM FOR MALIGNANT NEOPLASM OF BREAST: ICD-10-CM

## 2022-06-03 DIAGNOSIS — Z86.010 PERSONAL HISTORY OF COLONIC POLYPS: ICD-10-CM

## 2022-06-03 DIAGNOSIS — N18.32 STAGE 3B CHRONIC KIDNEY DISEASE: Chronic | ICD-10-CM

## 2022-06-03 DIAGNOSIS — R06.09 DYSPNEA ON EXERTION: ICD-10-CM

## 2022-06-03 DIAGNOSIS — H93.11 SUBJECTIVE TINNITUS OF RIGHT EAR: ICD-10-CM

## 2022-06-03 DIAGNOSIS — R01.1 SYSTOLIC EJECTION MURMUR: ICD-10-CM

## 2022-06-03 DIAGNOSIS — Z00.00 MEDICARE ANNUAL WELLNESS VISIT, SUBSEQUENT: Primary | ICD-10-CM

## 2022-06-03 DIAGNOSIS — I10 ESSENTIAL HYPERTENSION: Chronic | ICD-10-CM

## 2022-06-03 DIAGNOSIS — Z12.11 SCREEN FOR COLON CANCER: ICD-10-CM

## 2022-06-03 DIAGNOSIS — Z78.0 POSTMENOPAUSAL: ICD-10-CM

## 2022-06-03 DIAGNOSIS — E78.00 HYPERCHOLESTEROLEMIA: Chronic | ICD-10-CM

## 2022-06-03 PROBLEM — Z90.79 ACQUIRED ABSENCE OF OTHER GENITAL ORGAN(S): Status: ACTIVE | Noted: 2020-02-03

## 2022-06-03 PROBLEM — E87.0 HYPERNATREMIA: Status: ACTIVE | Noted: 2021-11-22

## 2022-06-03 PROBLEM — R31.29 MICROSCOPIC HEMATURIA: Status: ACTIVE | Noted: 2021-11-22

## 2022-06-03 PROBLEM — M19.90 OSTEOARTHRITIS: Status: ACTIVE | Noted: 2021-11-22

## 2022-06-03 PROBLEM — R11.0 NAUSEA: Status: ACTIVE | Noted: 2020-02-26

## 2022-06-03 PROBLEM — Z85.42 HISTORY OF MALIGNANT NEOPLASM OF ENDOMETRIUM: Status: ACTIVE | Noted: 2021-01-21

## 2022-06-03 PROBLEM — E83.42 HYPOMAGNESEMIA: Status: ACTIVE | Noted: 2021-11-22

## 2022-06-03 PROBLEM — D63.1 ANEMIA IN CHRONIC KIDNEY DISEASE: Status: ACTIVE | Noted: 2021-11-22

## 2022-06-03 PROBLEM — R33.8 ACUTE RETENTION OF URINE: Status: ACTIVE | Noted: 2020-02-03

## 2022-06-03 PROBLEM — N28.9 ABNORMAL RENAL FUNCTION: Status: ACTIVE | Noted: 2022-02-08

## 2022-06-03 PROBLEM — I12.9 BENIGN HYPERTENSIVE KIDNEY DISEASE: Status: ACTIVE | Noted: 2021-11-22

## 2022-06-03 PROBLEM — N18.9 ANEMIA IN CHRONIC KIDNEY DISEASE: Status: ACTIVE | Noted: 2021-11-22

## 2022-06-03 PROBLEM — R06.83 SNORING: Status: ACTIVE | Noted: 2022-02-08

## 2022-06-03 PROBLEM — E55.9 VITAMIN D DEFICIENCY: Status: ACTIVE | Noted: 2021-11-22

## 2022-06-03 PROBLEM — C55 MALIGNANT NEOPLASM OF UTERUS (HCC): Status: ACTIVE | Noted: 2021-11-22

## 2022-06-03 PROBLEM — E83.39 HYPOPHOSPHATEMIA: Status: ACTIVE | Noted: 2021-11-22

## 2022-06-03 PROCEDURE — 1159F MED LIST DOCD IN RCRD: CPT | Performed by: FAMILY MEDICINE

## 2022-06-03 PROCEDURE — 99214 OFFICE O/P EST MOD 30 MIN: CPT | Performed by: FAMILY MEDICINE

## 2022-06-03 PROCEDURE — 1125F AMNT PAIN NOTED PAIN PRSNT: CPT | Performed by: FAMILY MEDICINE

## 2022-06-03 PROCEDURE — 1170F FXNL STATUS ASSESSED: CPT | Performed by: FAMILY MEDICINE

## 2022-06-03 PROCEDURE — G0439 PPPS, SUBSEQ VISIT: HCPCS | Performed by: FAMILY MEDICINE

## 2022-06-03 NOTE — PROGRESS NOTES
The ABCs of the Annual Wellness Visit  Subsequent Medicare Wellness Visit    Chief Complaint   Patient presents with   • Medicare Wellness-subsequent      Subjective    History of Present Illness:  Tanya Greenwood is a 77 y.o. female who presents for a Subsequent Medicare Wellness Visit.    The following portions of the patient's history were reviewed and   updated as appropriate: allergies, current medications, past family history, past medical history, past social history, past surgical history and problem list.    Compared to one year ago, the patient feels her physical   health is the same.    Compared to one year ago, the patient feels her mental   health is the same.    Additional code chronic care management:    Hypertension - stable.  Patient taking medication as prescribed.  Denies chest pain, shortness of breath, headache, lower extremity edema.  Patient is taking amlodipine, metoprolol, irbesartan, hydralazine.     HLD-stable.  Patient taking atorvastatin, Zetia as prescribed.  Trying to adhere to a low-fat diet.     Sees a renal specialist Dr. Kaur for chronic kidney disease.    Stable.  Avoiding nephrotoxic agents.      Clinically stable chronic medical conditions as above.  Continue medications as above.  Labs today.    She is still having issues with the right ear waterfall sounds.  Did not get better with medrol pack last time.  Never seen an ENT.     She has been more tired lately especially when exerting herself.  She is taking her Lasix as prescribed weight is donwn.  I noticed a systolic murmur on exam that I do not remember hearing in the past.  Echo in 2020 did not mention anything about aortic stenosis.  It was best heard in the right second intercostal space and the history would fit with something like aortic stenosis.  I will get an echo.  Also get a chest XR.  She is still following with cardiology but appt is not until December.          Recent Hospitalizations:    She was not admitted  to the hospital during the last year.       Current Medical Providers:  Patient Care Team:  Garret Cross MD as PCP - General (Family Medicine)  Concepcion Wright MD as Consulting Physician (Cardiology)  Clarence Carvajal MD as Consulting Physician (Nephrology)    Outpatient Medications Prior to Visit   Medication Sig Dispense Refill   • amLODIPine (NORVASC) 5 MG tablet Take 1 tablet by mouth Daily. 90 tablet 3   • aspirin 81 MG chewable tablet Chew 81 mg Daily.     • atorvastatin (LIPITOR) 40 MG tablet Take 1 tablet by mouth Every Night. 90 tablet 0   • Cholecalciferol (VITAMIN D-3 PO) Take 2,000 Units by mouth Daily.     • ezetimibe (ZETIA) 10 MG tablet Take 1 tablet by mouth Daily. 90 tablet 3   • ferrous sulfate 325 (65 FE) MG tablet Take 65 mg by mouth Daily With Breakfast.     • furosemide (LASIX) 40 MG tablet Take 40 mg by mouth Daily. 1 po Monday, Wednesday, and Friday     • gabapentin (NEURONTIN) 300 MG capsule Take 300 mg by mouth 2 (Two) Times a Day.     • hydrALAZINE (APRESOLINE) 50 MG tablet Take 50 mg by mouth 2 (Two) Times a Day.     • magnesium oxide (MAGOX) 400 (241.3 Mg) MG tablet tablet Take 400 mg by mouth Daily.     • metoprolol succinate XL (TOPROL-XL) 50 MG 24 hr tablet TAKE 1 TABLET BY MOUTH TWICE DAILY 180 tablet 1     No facility-administered medications prior to visit.       No opioid medication identified on active medication list. I have reviewed chart for other potential  high risk medication/s and harmful drug interactions in the elderly.          Aspirin is on active medication list. Aspirin use is indicated based on review of current medical condition/s. Pros and cons of this therapy have been discussed today. Benefits of this medication outweigh potential harm.  Patient has been encouraged to continue taking this medication.  .      Patient Active Problem List   Diagnosis   • Hypercholesterolemia   • Essential hypertension   • Coronary atherosclerosis   • Stage 3b chronic  "kidney disease (HCC)   • Palpitations   • Encounter for screening for vascular disease   • Pulmonary HTN (HCC)   • Endometrial cancer (HCC)   • Neuropathy   • MOLINA (obstructive sleep apnea) treated with auto CPAP   • Hypoxemia associated with sleep   • Hypersomnia due to medical condition   • Drug-induced polyneuropathy (HCC)   • Carcinosarcoma of body of uterus (HCC)   • Abnormal renal function   • Acquired absence of other genital organ(s)   • Acute retention of urine   • Anemia in chronic kidney disease   • Benign hypertensive kidney disease   • History of malignant neoplasm of endometrium   • Hypernatremia   • Hypomagnesemia   • Hypophosphatemia   • Malignant neoplasm of uterus (HCC)   • Microscopic hematuria   • Nausea   • Osteoarthritis   • Snoring   • Vitamin D deficiency     Advance Care Planning  Advance Directive is not on file.  ACP discussion was held with the patient during this visit. Patient has an advance directive (not in EMR), copy requested.          Objective    Vitals:    06/03/22 0918   BP: 128/78   BP Location: Left arm   Patient Position: Sitting   Cuff Size: Adult   Pulse: 67   Temp: 97.8 °F (36.6 °C)   SpO2: 99%   Weight: 92.1 kg (203 lb)   Height: 170.2 cm (67\")   PainSc:   8   PainLoc: Back     Body mass index is 31.79 kg/m².  BMI is >= 30 and <= 34.9 (Class 1 obesity). The following options were offered after discussion: exercise counseling/recommendations and nutrition counseling/recommendations    Does the patient have evidence of cognitive impairment? No    Physical Exam  Vitals and nursing note reviewed.   Constitutional:       General: She is not in acute distress.     Appearance: Normal appearance.   HENT:      Right Ear: Hearing, tympanic membrane, ear canal and external ear normal.      Left Ear: Hearing, tympanic membrane, ear canal and external ear normal.   Cardiovascular:      Rate and Rhythm: Normal rate and regular rhythm.      Heart sounds: Murmur heard.    Systolic murmur " is present with a grade of 1/6.  Pulmonary:      Effort: Pulmonary effort is normal.      Breath sounds: Normal breath sounds.   Neurological:      Mental Status: She is alert.                 HEALTH RISK ASSESSMENT    Smoking Status:  Social History     Tobacco Use   Smoking Status Former Smoker   • Packs/day: 1.50   • Years: 6.00   • Pack years: 9.00   • Types: Cigarettes   • Quit date:    • Years since quittin.4   Smokeless Tobacco Never Used     Alcohol Consumption:  Social History     Substance and Sexual Activity   Alcohol Use No    Comment: No caffeine use     Fall Risk Screen:    STEADI Fall Risk Assessment was completed, and patient is at LOW risk for falls.Assessment completed on:6/3/2022    Depression Screening:  PHQ-2/PHQ-9 Depression Screening 6/3/2022   Retired Total Score -   Little Interest or Pleasure in Doing Things 0-->not at all   Feeling Down, Depressed or Hopeless 0-->not at all   PHQ-9: Brief Depression Severity Measure Score 0       Health Habits and Functional and Cognitive Screening:  Functional & Cognitive Status 6/3/2022   Do you have difficulty preparing food and eating? No   Do you have difficulty bathing yourself, getting dressed or grooming yourself? No   Do you have difficulty using the toilet? No   Do you have difficulty moving around from place to place? No   Do you have trouble with steps or getting out of a bed or a chair? No   Current Diet Well Balanced Diet   Dental Exam Up to date   Eye Exam Up to date   Exercise (times per week) 4 times per week   Current Exercises Include Stationary Bicycling/Spin Class   Current Exercise Activities Include -   Do you need help using the phone?  No   Are you deaf or do you have serious difficulty hearing?  No   Do you need help with transportation? Yes   Do you need help shopping? No   Do you need help preparing meals?  No   Do you need help with housework?  No   Do you need help with laundry? No   Do you need help taking your  medications? No   Do you need help managing money? No   Do you ever drive or ride in a car without wearing a seat belt? No   Have you felt unusual stress, anger or loneliness in the last month? No   Who do you live with? Spouse   If you need help, do you have trouble finding someone available to you? No   Have you been bothered in the last four weeks by sexual problems? No   Do you have difficulty concentrating, remembering or making decisions? No       Age-appropriate Screening Schedule:  Refer to the list below for future screening recommendations based on patient's age, sex and/or medical conditions. Orders for these recommended tests are listed in the plan section. The patient has been provided with a written plan.    Health Maintenance   Topic Date Due   • ZOSTER VACCINE (1 of 2) Never done   • DXA SCAN  09/15/2021   • INFLUENZA VACCINE  08/01/2022   • LIPID PANEL  12/13/2022   • MAMMOGRAM  03/23/2023   • TDAP/TD VACCINES (2 - Td or Tdap) 10/25/2026              Common labs    Common Labsle 12/13/21   Total Cholesterol 187   Triglycerides 124   HDL Cholesterol 45   LDL Cholesterol  120 (A)   (A) Abnormal value                Assessment & Plan   CMS Preventative Services Quick Reference  Risk Factors Identified During Encounter  Immunizations Discussed/Encouraged (specific Immunizations; Prevnar 20 (Pneumococcal 20-valent conjugate), Shingrix and COVID19  The above risks/problems have been discussed with the patient.  Follow up actions/plans if indicated are seen below in the Assessment/Plan Section.  Pertinent information has been shared with the patient in the After Visit Summary.    Diagnoses and all orders for this visit:    1. Medicare annual wellness visit, subsequent (Primary)    2. Essential hypertension  -     CBC & Differential  -     Comprehensive Metabolic Panel    3. Hypercholesterolemia  -     CBC & Differential  -     Comprehensive Metabolic Panel  -     Lipid Panel With LDL / HDL Ratio  -      CK    4. Stage 3b chronic kidney disease (HCC)  -     CBC & Differential  -     Comprehensive Metabolic Panel  -     Magnesium  -     CK    5. Subjective tinnitus of right ear  -     Ambulatory Referral to ENT (Otolaryngology)    6. Dyspnea on exertion  -     XR Chest PA & Lateral; Future  -     Adult Transthoracic Echo Complete W/ Cont if Necessary Per Protocol; Future    7. Systolic ejection murmur  -     Adult Transthoracic Echo Complete W/ Cont if Necessary Per Protocol; Future    8. Screen for colon cancer  -     Ambulatory Referral For Screening Colonoscopy    9. Personal history of colonic polyps  -     Ambulatory Referral For Screening Colonoscopy    10. Encounter for screening mammogram for malignant neoplasm of breast  -     Mammo screening digital tomosynthesis bilateral w CAD; Future    11. Postmenopausal  -     DEXA Bone Density Axial; Future          Follow Up:   Return in about 6 weeks (around 7/15/2022) for Recheck - shortness of breath on exertion.     An After Visit Summary and PPPS were made available to the patient.

## 2022-06-04 LAB
ALBUMIN SERPL-MCNC: 4.7 G/DL (ref 3.7–4.7)
ALBUMIN/GLOB SERPL: 1.8 {RATIO} (ref 1.2–2.2)
ALP SERPL-CCNC: 90 IU/L (ref 44–121)
ALT SERPL-CCNC: 21 IU/L (ref 0–32)
AST SERPL-CCNC: 24 IU/L (ref 0–40)
BASOPHILS # BLD AUTO: 0 X10E3/UL (ref 0–0.2)
BASOPHILS NFR BLD AUTO: 1 %
BILIRUB SERPL-MCNC: 0.6 MG/DL (ref 0–1.2)
BUN SERPL-MCNC: 22 MG/DL (ref 8–27)
BUN/CREAT SERPL: 14 (ref 12–28)
CALCIUM SERPL-MCNC: 10 MG/DL (ref 8.7–10.3)
CHLORIDE SERPL-SCNC: 107 MMOL/L (ref 96–106)
CHOLEST SERPL-MCNC: 209 MG/DL (ref 100–199)
CK SERPL-CCNC: 205 U/L (ref 32–182)
CO2 SERPL-SCNC: 22 MMOL/L (ref 20–29)
CREAT SERPL-MCNC: 1.53 MG/DL (ref 0.57–1)
EGFRCR SERPLBLD CKD-EPI 2021: 35 ML/MIN/1.73
EOSINOPHIL # BLD AUTO: 0.1 X10E3/UL (ref 0–0.4)
EOSINOPHIL NFR BLD AUTO: 2 %
ERYTHROCYTE [DISTWIDTH] IN BLOOD BY AUTOMATED COUNT: 14.7 % (ref 11.7–15.4)
GLOBULIN SER CALC-MCNC: 2.6 G/DL (ref 1.5–4.5)
GLUCOSE SERPL-MCNC: 99 MG/DL (ref 65–99)
HCT VFR BLD AUTO: 37.3 % (ref 34–46.6)
HDLC SERPL-MCNC: 46 MG/DL
HGB BLD-MCNC: 12 G/DL (ref 11.1–15.9)
IMM GRANULOCYTES # BLD AUTO: 0 X10E3/UL (ref 0–0.1)
IMM GRANULOCYTES NFR BLD AUTO: 0 %
LDLC SERPL CALC-MCNC: 138 MG/DL (ref 0–99)
LDLC/HDLC SERPL: 3 RATIO (ref 0–3.2)
LYMPHOCYTES # BLD AUTO: 2.3 X10E3/UL (ref 0.7–3.1)
LYMPHOCYTES NFR BLD AUTO: 34 %
MAGNESIUM SERPL-MCNC: 2 MG/DL (ref 1.6–2.3)
MCH RBC QN AUTO: 27.1 PG (ref 26.6–33)
MCHC RBC AUTO-ENTMCNC: 32.2 G/DL (ref 31.5–35.7)
MCV RBC AUTO: 84 FL (ref 79–97)
MONOCYTES # BLD AUTO: 0.5 X10E3/UL (ref 0.1–0.9)
MONOCYTES NFR BLD AUTO: 7 %
NEUTROPHILS # BLD AUTO: 3.7 X10E3/UL (ref 1.4–7)
NEUTROPHILS NFR BLD AUTO: 56 %
PLATELET # BLD AUTO: 178 X10E3/UL (ref 150–450)
POTASSIUM SERPL-SCNC: 4.5 MMOL/L (ref 3.5–5.2)
PROT SERPL-MCNC: 7.3 G/DL (ref 6–8.5)
RBC # BLD AUTO: 4.43 X10E6/UL (ref 3.77–5.28)
SODIUM SERPL-SCNC: 145 MMOL/L (ref 134–144)
TRIGL SERPL-MCNC: 138 MG/DL (ref 0–149)
VLDLC SERPL CALC-MCNC: 25 MG/DL (ref 5–40)
WBC # BLD AUTO: 6.6 X10E3/UL (ref 3.4–10.8)

## 2022-06-27 DIAGNOSIS — E78.00 HYPERCHOLESTEROLEMIA: ICD-10-CM

## 2022-06-28 RX ORDER — EZETIMIBE 10 MG/1
10 TABLET ORAL DAILY
Qty: 90 TABLET | Refills: 3 | Status: SHIPPED | OUTPATIENT
Start: 2022-06-28

## 2022-07-14 DIAGNOSIS — I10 ESSENTIAL HYPERTENSION: ICD-10-CM

## 2022-07-14 RX ORDER — AMLODIPINE BESYLATE 5 MG/1
5 TABLET ORAL DAILY
Qty: 90 TABLET | Refills: 3 | Status: SHIPPED | OUTPATIENT
Start: 2022-07-14

## 2022-08-22 RX ORDER — METOPROLOL SUCCINATE 50 MG/1
TABLET, EXTENDED RELEASE ORAL
Qty: 180 TABLET | Refills: 0 | Status: SHIPPED | OUTPATIENT
Start: 2022-08-22

## 2022-09-15 ENCOUNTER — APPOINTMENT (OUTPATIENT)
Dept: SLEEP MEDICINE | Facility: HOSPITAL | Age: 77
End: 2022-09-15

## 2022-10-07 DIAGNOSIS — E78.00 HYPERCHOLESTEROLEMIA: ICD-10-CM

## 2022-10-07 RX ORDER — ATORVASTATIN CALCIUM 20 MG/1
20 TABLET, FILM COATED ORAL NIGHTLY
Qty: 90 TABLET | Refills: 0 | Status: SHIPPED | OUTPATIENT
Start: 2022-10-07

## 2022-10-26 ENCOUNTER — OFFICE VISIT (OUTPATIENT)
Dept: INTERNAL MEDICINE | Facility: CLINIC | Age: 77
End: 2022-10-26

## 2022-10-26 VITALS
DIASTOLIC BLOOD PRESSURE: 78 MMHG | OXYGEN SATURATION: 98 % | HEIGHT: 67 IN | WEIGHT: 198.4 LBS | TEMPERATURE: 97.8 F | SYSTOLIC BLOOD PRESSURE: 120 MMHG | BODY MASS INDEX: 31.14 KG/M2 | HEART RATE: 51 BPM

## 2022-10-26 DIAGNOSIS — M25.552 BILATERAL HIP PAIN: ICD-10-CM

## 2022-10-26 DIAGNOSIS — M25.512 CHRONIC LEFT SHOULDER PAIN: ICD-10-CM

## 2022-10-26 DIAGNOSIS — M25.551 BILATERAL HIP PAIN: ICD-10-CM

## 2022-10-26 DIAGNOSIS — R06.09 DYSPNEA ON EXERTION: Primary | ICD-10-CM

## 2022-10-26 DIAGNOSIS — G89.29 CHRONIC MIDLINE LOW BACK PAIN WITHOUT SCIATICA: ICD-10-CM

## 2022-10-26 DIAGNOSIS — M54.50 CHRONIC MIDLINE LOW BACK PAIN WITHOUT SCIATICA: ICD-10-CM

## 2022-10-26 DIAGNOSIS — G89.29 CHRONIC LEFT SHOULDER PAIN: ICD-10-CM

## 2022-10-26 DIAGNOSIS — R01.1 SYSTOLIC EJECTION MURMUR: ICD-10-CM

## 2022-10-26 PROCEDURE — 99213 OFFICE O/P EST LOW 20 MIN: CPT | Performed by: FAMILY MEDICINE

## 2022-10-26 NOTE — PROGRESS NOTES
"Chief Complaint  Shortness of Breath (Follow up )    Subjective        Tanya Greenwood presents to [facility]  History of Present Illness     She is following up today regarding her shortness of breath.  At the time I saw her in June, I heard a systolic murmur with no history to describe why a murmur would be present.  I had ordered a echo which does not appear to have been done.  I also ordered a chest x-ray which also appears to have not been done.    She informs me that the tests booked out until December and she is waiting but SOB has improved.  She is using IS at home and it seems to be helping her lungs.  She has an appointment with Cardiology in December.      She is complaining about low back and right pain.  She says this has been going on ever since she had the chemo.  She says that her low back hurts frequently and when she tries to walk she has to push in her gluteal area bilaterally while she walks to control pain.  She is also having left shoulder pain between her shoulder and her neck.  She says she feels like something is moving when she manipulates her shoulder.    Objective   Vital Signs:  /78 (BP Location: Left arm, Patient Position: Sitting, Cuff Size: Adult)   Pulse 51   Temp 97.8 °F (36.6 °C) (Temporal)   Ht 170.2 cm (67\")   Wt 90 kg (198 lb 6.4 oz)   SpO2 98%   BMI 31.07 kg/m²   Estimated body mass index is 31.07 kg/m² as calculated from the following:    Height as of this encounter: 170.2 cm (67\").    Weight as of this encounter: 90 kg (198 lb 6.4 oz).          Physical Exam  Vitals and nursing note reviewed.   Constitutional:       General: She is not in acute distress.     Appearance: Normal appearance.   Cardiovascular:      Rate and Rhythm: Normal rate and regular rhythm.      Heart sounds: Murmur heard.    Systolic murmur is present with a grade of 1/6.     Comments: Best heard in right second intercostal space  Pulmonary:      Effort: " Pulmonary effort is normal.      Breath sounds: Normal breath sounds.   Musculoskeletal:      Right shoulder: Normal.      Lumbar back: Spasms and tenderness present. No bony tenderness.      Right hip: Normal. No tenderness or bony tenderness. Normal range of motion.      Left hip: Normal. No tenderness or bony tenderness. Normal range of motion.      Comments: Trapezius spasm present on exam from the left shoulder to the occipital area of the neck.   Neurological:      Mental Status: She is alert.        Result Review :  The following data was reviewed by: Garret Cross MD on 10/26/2022:  Common labs    Common Labs 12/13/21 6/3/22 6/3/22 6/3/22     1033 1033 1033   Glucose   99    BUN   22    Creatinine   1.53 (A)    Sodium   145 (A)    Potassium   4.5    Chloride   107 (A)    Calcium   10.0    Total Protein   7.3    Albumin   4.7    Total Bilirubin   0.6    Alkaline Phosphatase   90    AST (SGOT)   24    ALT (SGPT)   21    WBC  6.6     Hemoglobin  12.0     Hematocrit  37.3     Platelets  178     Total Cholesterol 187   209 (A)   Triglycerides 124   138   HDL Cholesterol 45   46   LDL Cholesterol  120 (A)   138 (A)   (A) Abnormal value                      Assessment and Plan   Diagnoses and all orders for this visit:    1. Dyspnea on exertion (Primary)    2. Systolic ejection murmur    3. Chronic midline low back pain without sciatica  -     Ambulatory Referral to Physical Therapy Evaluate and treat    4. Bilateral hip pain  -     Ambulatory Referral to Physical Therapy Evaluate and treat    5. Chronic left shoulder pain  -     Ambulatory Referral to Physical Therapy Evaluate and treat    It sounds like the incentive spirometer helped with her dyspnea on exertion.  It is possible that the issue with her heart has nothing to do with her shortness of breath.  The murmur sounds lessened today but I can still hear it.  She has all of her testing scheduled in December and I think it is fine to wait given her  clinical status is improving.  For the back pain and shoulder pain, I have referred her to physical therapy as above.  If not improving, she should follow-up with me and we will possibly get an x-ray.  I do not think an x-ray is needed at this time given that likely she has degenerative disc disease of the lumbar and possibly mild arthritis of the hips given exam.  I do not think an x-ray at this time would give more information unless physical therapy begins to hurt her.  I recommended light use of Voltaren gel, Aspercreme, heat therapy, and Biofreeze if that helps as well while waiting for physical therapy.       I spent 20 minutes caring for Tanya on this date of service. This time includes time spent by me in the following activities:preparing for the visit, obtaining and/or reviewing a separately obtained history, performing a medically appropriate examination and/or evaluation , counseling and educating the patient/family/caregiver, referring and communicating with other health care professionals  and documenting information in the medical record  Follow Up   Return in about 4 months (around 2/26/2023) for Next scheduled follow up.  Patient was given instructions and counseling regarding her condition or for health maintenance advice. Please see specific information pulled into the AVS if appropriate.

## 2022-12-01 ENCOUNTER — APPOINTMENT (OUTPATIENT)
Dept: SLEEP MEDICINE | Facility: HOSPITAL | Age: 77
End: 2022-12-01

## 2022-12-07 ENCOUNTER — OFFICE VISIT (OUTPATIENT)
Dept: SLEEP MEDICINE | Facility: HOSPITAL | Age: 77
End: 2022-12-07

## 2022-12-07 VITALS — HEART RATE: 59 BPM | WEIGHT: 193 LBS | BODY MASS INDEX: 30.29 KG/M2 | OXYGEN SATURATION: 98 % | HEIGHT: 67 IN

## 2022-12-07 DIAGNOSIS — G47.33 OSA (OBSTRUCTIVE SLEEP APNEA): Primary | ICD-10-CM

## 2022-12-07 DIAGNOSIS — G47.14 HYPERSOMNIA DUE TO MEDICAL CONDITION: ICD-10-CM

## 2022-12-07 DIAGNOSIS — G47.36 HYPOXEMIA ASSOCIATED WITH SLEEP: ICD-10-CM

## 2022-12-07 PROCEDURE — G0463 HOSPITAL OUTPT CLINIC VISIT: HCPCS

## 2022-12-07 PROCEDURE — 99213 OFFICE O/P EST LOW 20 MIN: CPT | Performed by: INTERNAL MEDICINE

## 2022-12-07 NOTE — PROGRESS NOTES
"Follow Up [facility] Note     Chief Complaint:  MOLINA     The patient obtained a new DreamStation 2 auto CPAP from [employer] related to the recall 2022    Primary Care Physician: Garret Cross MD    Interval History:   The patient is a 77 y.o. female  who I last saw 2022 and that note was reviewed.  The patient reports she is improved some.  The patient goes to bed around 9:30 PM and gets out of bed around 8 AM.  She will use the bathroom during the nighttime.  Additionally, recently, she has been complaining of some increasing back pain.    Review of Systems:    A complete review of systems was done and all were negative with the exception of the above    Social History:    Social History     Socioeconomic History   • Marital status:      Spouse name: REJI   • Number of children: 4   Tobacco Use   • Smoking status: Former     Packs/day: 1.50     Years: 6.00     Pack years: 9.00     Types: Cigarettes     Quit date:      Years since quittin.9   • Smokeless tobacco: Never   Substance and Sexual Activity   • Alcohol use: No     Comment: No caffeine use   • Drug use: No   • Sexual activity: Not Currently     Partners: Male     Birth control/protection: Post-menopausal       Allergies:  Lipitor [atorvastatin]     Medication Review:  Reviewed.      Vital Signs:    Vitals:    22 1115   Pulse: 59   SpO2: 98%   Weight: 87.5 kg (193 lb)   Height: 170.2 cm (67\")     Body mass index is 30.23 kg/m².    Physical Exam:    Constitutional:  Well developed 77 y.o. female that appears in no apparent distress.  Awake & oriented times 3.  Normal mood with normal recent and remote memory and normal judgement.  Eyes:  Conjunctivae normal.  Oropharynx: Previously, moist mucous membranes without exudate and a large tongue and normal uvula and patent posterior pharyngeal opening and class III Mallampati airway, patient is wearing a facemask.    Self-administered [place] Sleepiness " Scale test results: 4, previously 5  0-5 Lower normal daytime sleepiness  6-10 Higher normal daytime sleepiness  11-12 Mild, 13-15 Moderate, & 16-24 Severe excessive daytime sleepiness     Downloaded PAP Data Reviewed For Compliance:  DME is [employer] and she uses a nasal cushion.  Downloads between 8/20 and 11/17/2022 compliance only 39%.  Average usage is 4 hours and 13 minutes.  Average AHI is mildly abnormal at 10.4 most likely secondary to her average large leak of 1 hour 1 minute.  Average auto CPAP pressure is 8.8 and her DreamStation 2 auto CPAP is 7-16    I have reviewed the above results and compared them with the patient's last downloads and reviewed with the patient.    Impression:   Mild obstructive sleep apnea, probably underestimated, with sleep-related hypoxia by home sleep study 11/4/2020  adequately treated with new DreamStation 2 auto CPAP.    The patient is not at goal with compliance but demonstrates acceptable usage.  Presently, the patient has some complaints of hypersomnolence.     Plan:  Good sleep hygiene measures should be maintained.  Weight loss would be beneficial in this patient who is obese by Body mass index is 30.23 kg/m²..      After evaluating the patient and assessing results available, the patient is benefiting from the treatment being provided when used.     The patient will continue DreamStation 2 auto CPAP.  After clinical evaluation and review of downloads, I recommend no changes to the patient's pressures.  However, the patient needs to use her device daily.  A new prescription will be sent to the patient's DME.    I answered all of the patient's questions.  The patient will call for any problems and will follow up in 6-8 months.      Yan Quintanilla MD  Sleep Medicine  12/07/22  11:23 EST

## 2022-12-13 ENCOUNTER — HOSPITAL ENCOUNTER (OUTPATIENT)
Dept: MAMMOGRAPHY | Facility: HOSPITAL | Age: 77
Discharge: HOME | End: 2022-12-13

## 2022-12-13 ENCOUNTER — HOSPITAL ENCOUNTER (OUTPATIENT)
Dept: CARDIOLOGY | Facility: HOSPITAL | Age: 77
Discharge: HOME | End: 2022-12-13

## 2022-12-13 ENCOUNTER — HOSPITAL ENCOUNTER (OUTPATIENT)
Dept: BONE DENSITY | Facility: HOSPITAL | Age: 77
Discharge: HOME | End: 2022-12-13

## 2022-12-13 VITALS
HEIGHT: 67 IN | DIASTOLIC BLOOD PRESSURE: 68 MMHG | BODY MASS INDEX: 30.29 KG/M2 | WEIGHT: 193 LBS | SYSTOLIC BLOOD PRESSURE: 127 MMHG

## 2022-12-13 DIAGNOSIS — R01.1 SYSTOLIC EJECTION MURMUR: ICD-10-CM

## 2022-12-13 DIAGNOSIS — Z78.0 POSTMENOPAUSAL: ICD-10-CM

## 2022-12-13 DIAGNOSIS — R06.09 DYSPNEA ON EXERTION: ICD-10-CM

## 2022-12-13 DIAGNOSIS — Z12.31 ENCOUNTER FOR SCREENING MAMMOGRAM FOR MALIGNANT NEOPLASM OF BREAST: ICD-10-CM

## 2022-12-13 LAB
AORTIC DIMENSIONLESS INDEX: 0.7 (DI)
BH CV ECHO MEAS - AO MAX PG: 16.6 MMHG
BH CV ECHO MEAS - AO MEAN PG: 8.3 MMHG
BH CV ECHO MEAS - AO ROOT DIAM: 2.8 CM
BH CV ECHO MEAS - AO V2 MAX: 203.6 CM/SEC
BH CV ECHO MEAS - AO V2 VTI: 51.3 CM
BH CV ECHO MEAS - AVA(I,D): 2.07 CM2
BH CV ECHO MEAS - EDV(CUBED): 87.3 ML
BH CV ECHO MEAS - EDV(MOD-SP2): 74 ML
BH CV ECHO MEAS - EDV(MOD-SP4): 72 ML
BH CV ECHO MEAS - EF(MOD-BP): 62.5 %
BH CV ECHO MEAS - EF(MOD-SP2): 62.2 %
BH CV ECHO MEAS - EF(MOD-SP4): 63.9 %
BH CV ECHO MEAS - ESV(CUBED): 16.5 ML
BH CV ECHO MEAS - ESV(MOD-SP2): 28 ML
BH CV ECHO MEAS - ESV(MOD-SP4): 26 ML
BH CV ECHO MEAS - FS: 42.6 %
BH CV ECHO MEAS - IVS/LVPW: 1.2 CM
BH CV ECHO MEAS - IVSD: 1.28 CM
BH CV ECHO MEAS - LAT PEAK E' VEL: 12 CM/SEC
BH CV ECHO MEAS - LV DIASTOLIC VOL/BSA (35-75): 36.2 CM2
BH CV ECHO MEAS - LV MASS(C)D: 188.1 GRAMS
BH CV ECHO MEAS - LV MAX PG: 8.6 MMHG
BH CV ECHO MEAS - LV MEAN PG: 4 MMHG
BH CV ECHO MEAS - LV SYSTOLIC VOL/BSA (12-30): 13.1 CM2
BH CV ECHO MEAS - LV V1 MAX: 146.8 CM/SEC
BH CV ECHO MEAS - LV V1 VTI: 34.7 CM
BH CV ECHO MEAS - LVIDD: 4.4 CM
BH CV ECHO MEAS - LVIDS: 2.5 CM
BH CV ECHO MEAS - LVOT AREA: 3.1 CM2
BH CV ECHO MEAS - LVOT DIAM: 1.97 CM
BH CV ECHO MEAS - LVPWD: 1.07 CM
BH CV ECHO MEAS - MED PEAK E' VEL: 6.4 CM/SEC
BH CV ECHO MEAS - MR MAX PG: 67.4 MMHG
BH CV ECHO MEAS - MR MAX VEL: 410.5 CM/SEC
BH CV ECHO MEAS - MV A DUR: 0.1 SEC
BH CV ECHO MEAS - MV A MAX VEL: 86.1 CM/SEC
BH CV ECHO MEAS - MV DEC SLOPE: 440.1 CM/SEC2
BH CV ECHO MEAS - MV DEC TIME: 251 MSEC
BH CV ECHO MEAS - MV E MAX VEL: 75.8 CM/SEC
BH CV ECHO MEAS - MV E/A: 0.88
BH CV ECHO MEAS - MV MAX PG: 4.1 MMHG
BH CV ECHO MEAS - MV MEAN PG: 1.35 MMHG
BH CV ECHO MEAS - MV P1/2T: 62 MSEC
BH CV ECHO MEAS - MV V2 VTI: 28.9 CM
BH CV ECHO MEAS - MVA(P1/2T): 3.6 CM2
BH CV ECHO MEAS - MVA(VTI): 3.7 CM2
BH CV ECHO MEAS - PA ACC TIME: 0.12 SEC
BH CV ECHO MEAS - PA PR(ACCEL): 24.3 MMHG
BH CV ECHO MEAS - PA V2 MAX: 88.2 CM/SEC
BH CV ECHO MEAS - PULM A REVS DUR: 0.07 SEC
BH CV ECHO MEAS - PULM A REVS VEL: 33.6 CM/SEC
BH CV ECHO MEAS - PULM DIAS VEL: 46.7 CM/SEC
BH CV ECHO MEAS - PULM S/D: 1.09
BH CV ECHO MEAS - PULM SYS VEL: 50.9 CM/SEC
BH CV ECHO MEAS - RAP SYSTOLE: 3 MMHG
BH CV ECHO MEAS - RV MAX PG: 1.81 MMHG
BH CV ECHO MEAS - RV V1 MAX: 67.3 CM/SEC
BH CV ECHO MEAS - RV V1 VTI: 15.4 CM
BH CV ECHO MEAS - RVSP: 44.3 MMHG
BH CV ECHO MEAS - SI(MOD-SP2): 23.1 ML/M2
BH CV ECHO MEAS - SI(MOD-SP4): 23.1 ML/M2
BH CV ECHO MEAS - SV(LVOT): 106 ML
BH CV ECHO MEAS - SV(MOD-SP2): 46 ML
BH CV ECHO MEAS - SV(MOD-SP4): 46 ML
BH CV ECHO MEAS - TAPSE (>1.6): 1.7 CM
BH CV ECHO MEAS - TR MAX PG: 41.3 MMHG
BH CV ECHO MEAS - TR MAX VEL: 321.4 CM/SEC
BH CV ECHO MEASUREMENTS AVERAGE E/E' RATIO: 8.24
BH CV XLRA - RV BASE: 3.1 CM
BH CV XLRA - RV LENGTH: 6.8 CM
BH CV XLRA - RV MID: 2.06 CM
BH CV XLRA - TDI S': 8.7 CM/SEC
LEFT ATRIUM VOLUME INDEX: 23.5 ML/M2
MAXIMAL PREDICTED HEART RATE: 144 BPM
STRESS TARGET HR: 122 BPM

## 2022-12-13 PROCEDURE — 77067 SCR MAMMO BI INCL CAD: CPT

## 2022-12-13 PROCEDURE — 77063 BREAST TOMOSYNTHESIS BI: CPT

## 2022-12-13 PROCEDURE — 77080 DXA BONE DENSITY AXIAL: CPT

## 2022-12-13 PROCEDURE — 93306 TTE W/DOPPLER COMPLETE: CPT

## 2022-12-13 PROCEDURE — 93306 TTE W/DOPPLER COMPLETE: CPT | Performed by: INTERNAL MEDICINE

## 2022-12-19 ENCOUNTER — OFFICE VISIT (OUTPATIENT)
Dept: CARDIOLOGY | Facility: CLINIC | Age: 77
End: 2022-12-19

## 2022-12-19 VITALS
WEIGHT: 194.4 LBS | HEIGHT: 67 IN | SYSTOLIC BLOOD PRESSURE: 123 MMHG | HEART RATE: 67 BPM | DIASTOLIC BLOOD PRESSURE: 79 MMHG | OXYGEN SATURATION: 99 % | BODY MASS INDEX: 30.51 KG/M2

## 2022-12-19 DIAGNOSIS — I27.20 PULMONARY HTN: ICD-10-CM

## 2022-12-19 DIAGNOSIS — I10 ESSENTIAL HYPERTENSION: Chronic | ICD-10-CM

## 2022-12-19 DIAGNOSIS — I25.10 ATHEROSCLEROSIS OF NATIVE CORONARY ARTERY OF NATIVE HEART WITHOUT ANGINA PECTORIS: Primary | ICD-10-CM

## 2022-12-19 PROCEDURE — 93000 ELECTROCARDIOGRAM COMPLETE: CPT | Performed by: NURSE PRACTITIONER

## 2022-12-19 PROCEDURE — 99214 OFFICE O/P EST MOD 30 MIN: CPT | Performed by: NURSE PRACTITIONER

## 2022-12-19 NOTE — PROGRESS NOTES
Date of Office Visit: 2022  Encounter Provider: Joana Christian, ABHIJIT, APRN  Place of Service: [facility] CARDIOLOGY  Patient Name: Tanya Greenwood  :1945        Subjective:     Chief Complaint:  Coronary artery disease, hypertension      History of Present Illness:  Tanya Greenwood is a 77 y.o. female patient of Dr. Wright.  I am seeing this patient in the office today and I have reviewed her records.    Patient has a history of coronary artery disease, hypertension, pulmonary hypertension, hyperlipidemia, shortness of breath, fatigue, uterine cancer status post chemotherapy and radiation.     In 2008 patient complained of exertional chest pain in the setting of hypertension.  Echocardiogram showed mild pulmonary hypertension with RVSP of 39 mmHg with normal systolic function and no significant valvular disease.  Cardiac catheterization showed 50% ostial right coronary artery stenosis with 20 to 30% stenosis of the left main, proximal LAD, and circumflex vessels.  She was treated medically.  Follow-up echocardiogram in  showed normal pulmonary pressures.  PET stress test was also negative for ischemia.  2016 she had a treadmill exercise stress test that was negative for ischemia.  She was last seen in the office 10/2019 by Dr. Wright at which point she was staying active without anginal symptoms.  She is was subsequently diagnosed with uterine cancer 2020 and completed chemo and radiation and at 10/2020 follow-up reported that she was cancer free.  She did however have neuropathy which was felt to be from the chemotherapy per patient.  Blood pressure was well controlled.  She reported some shortness of breath and fatigue with exertion though she thought that it was improving with her exercise bike routine.  Echocardiogram 10/2020 showed normal LV systolic function with EF of 57%, grade 1 diastolic dysfunction, trace tricuspid regurgitation with normal  RVSP.  PET stress test 10/2020 was negative for ischemia, considered a low risk study.  Home sleep study 11/2020 did indicate mild sleep apnea as well as some sleep-related hypoxia and sleep medicine appointment was recommended.       Patient presents to office today for follow-up appointment.  Patient reports she is doing well since last visit.  Her PCP had ordered an echo due to murmur and it did show some mild pulmonary hypertension again.  She reports that she has just been on her CPAP now for maybe a month at the most as she had issues getting replacement until then.  She still follows with sleep medicine.  She does her exercise bike for 20 to 25 minutes at a time and then maybe another 10 minutes in the evening.  She feels well with this.  She might get some mild shortness of breath if she does more than 25 minutes on her exercise bike but thinks that this is deconditioning.  Nothing new or worsening for the last couple of years.  She continues to get some dependent lower extremity edema by the end of the day which is well controlled with her compression socks and unchanged.  Denies chest pain or discomfort, dizziness, syncope, near syncope, falls, or abnormal bleeding.  Energy levels improved.  Blood pressure at home well controlled, about the same as today.        Past Medical History:   Diagnosis Date   • Cancer (HCC)     uterus   • Chronic renal insufficiency     stage 2 (mild)   • Coronary artery disease    • Coronary atherosclerosis    • Drug therapy    • Essential hypertension    • Hx of radiation therapy    • Hypercholesterolemia    • MOLINA (obstructive sleep apnea) 11/04/2020    Home sleep study.  Mild MOLINA with AHI 11 events per hour.  Low O2 saturation 80% and sleep-related hypoxia present for 14 minutes.  The patient snored 4% of the total monitoring time.   • Palpitations    • Pulmonary hypertension (HCC)      Past Surgical History:   Procedure Laterality Date   • HYSTERECTOMY     • JOINT REPLACEMENT      • REPLACEMENT TOTAL KNEE Right      Outpatient Medications Prior to Visit   Medication Sig Dispense Refill   • amLODIPine (NORVASC) 5 MG tablet TAKE 1 TABLET BY MOUTH DAILY 90 tablet 3   • aspirin 81 MG chewable tablet Chew 81 mg Daily.     • atorvastatin (LIPITOR) 20 MG tablet Take 1 tablet by mouth Every Night. 90 tablet 0   • Cholecalciferol (VITAMIN D-3 PO) Take 2,000 Units by mouth Daily.     • ezetimibe (ZETIA) 10 MG tablet TAKE 1 TABLET BY MOUTH DAILY 90 tablet 3   • ferrous sulfate 325 (65 FE) MG tablet Take 65 mg by mouth Daily With Breakfast.     • furosemide (LASIX) 40 MG tablet Take 40 mg by mouth Daily. 1 po Monday, Wednesday, and Friday     • hydrALAZINE (APRESOLINE) 50 MG tablet Take 50 mg by mouth 2 (Two) Times a Day.     • magnesium oxide (MAGOX) 400 (241.3 Mg) MG tablet tablet Take 400 mg by mouth Daily.     • metoprolol succinate XL (TOPROL-XL) 50 MG 24 hr tablet TAKE 1 TABLET BY MOUTH TWICE DAILY 180 tablet 0     No facility-administered medications prior to visit.       Allergies as of 2022 - Reviewed 2022   Allergen Reaction Noted   • Lipitor [atorvastatin] Other (See Comments) 2018     Social History     Socioeconomic History   • Marital status:      Spouse name: REJI   • Number of children: 4   Tobacco Use   • Smoking status: Former     Packs/day: 1.50     Years: 6.00     Pack years: 9.00     Types: Cigarettes     Quit date:      Years since quittin.9   • Smokeless tobacco: Never   Substance and Sexual Activity   • Alcohol use: No     Comment: No caffeine use   • Drug use: No   • Sexual activity: Not Currently     Partners: Male     Birth control/protection: Post-menopausal     Family History   Problem Relation Age of Onset   • Diabetes Mother    • Diabetes Father    • Breast cancer Sister        Review of Systems   Constitutional: Positive for malaise/fatigue (Improved).   Cardiovascular:        SEE HPI   Respiratory: Negative for shortness of  "breath.    Hematologic/Lymphatic: Negative for bleeding problem.   Musculoskeletal: Negative for falls.   Gastrointestinal: Negative for melena.   Genitourinary: Negative for hematuria.   Neurological: Negative for dizziness.   Psychiatric/Behavioral: Negative for altered mental status.          Objective:     Vitals:    12/19/22 1123   BP: 123/79   BP Location: Left arm   Patient Position: Sitting   Cuff Size: Adult   Pulse: 67   SpO2: 99%   Weight: 88.2 kg (194 lb 6.4 oz)   Height: 170.2 cm (67\")     Body mass index is 30.45 kg/m².      PHYSICAL EXAM:  Constitutional:       General: Not in acute distress.     Appearance: Well-developed. Not diaphoretic.   Eyes:      Pupils: Pupils are equal, round, and reactive to light.   HENT:      Head: Normocephalic and atraumatic.   Pulmonary:      Effort: Pulmonary effort is normal. No respiratory distress.      Breath sounds: Normal breath sounds. No wheezing. No rales.   Cardiovascular:      Normal rate. Regular rhythm.      Murmurs: There is no murmur.      No gallop. No click. No rub.   Edema:     Peripheral edema absent.   Abdominal:      General: Bowel sounds are normal.      Palpations: Abdomen is soft.   Musculoskeletal:         General: No tenderness or deformity. Skin:     General: Skin is warm and dry.      Findings: No erythema.   Neurological:      Mental Status: Alert and oriented to person, place, and time.   Psychiatric:         Behavior: Behavior normal.             ECG 12 Lead    Date/Time: 12/19/2022 2:00 PM  Performed by: Joana Christian DNP, APRN  Authorized by: Joana Christian DNP, NAREN   Comparison: compared with previous ECG from 12/9/2021  Comparison to previous ECG: No PVCs on EKG today  Rhythm: sinus rhythm  BPM: 52  Comments: No significant changes from previous EKGs              Assessment:       Diagnosis Plan   1. Atherosclerosis of native coronary artery of native heart without angina pectoris        2. Essential hypertension        3. " Pulmonary HTN (HCC)              Plan:     1. Coronary artery disease: Modest.  No ischemia on 10/2020 stress test.  Denies anginal symptoms.  Continue with aggressive risk factor modification.  2. Hypertension: Blood pressure well controlled in the office today.  Patient continue to monitor and call if staying greater than 130/80 on average.  3. PVCs: On beta-blocker.  None present on EKG today.  Continue with beta-blocker, avoid stimulants, treat sleep apnea.  4. Mild bradycardia: Asymptomatic.  Patient to continue to monitor and notify office if heart rate below 50 or symptoms at higher readings.  5. Obstructive sleep apnea: Follows with Dr. Quintanilla  6. Dyslipidemia: PCP managing.  LDL goal less than 70.  7. History of transient pulmonary hypertension: Normal pressures by 10/2020 echo however mild pulmonary hypertension present on recent echo ordered by PCP.  We discussed checking proBNP however she denies any real dyspnea symptoms and appears euvolemic on exam.  Nephrology managing her diuretic.  She has been off of her CPAP for a while and has been on it for less than a month now.  We will look at rechecking an echo when she has been on her CPAP 3 to 6 months consistently to ensure pulmonary pressures improved.  She will call sooner if she develops issues with dyspnea or swelling.  8. Renal insufficiency: Follows with Dr. Alatorre with nephrology who is managing her diuretic.  9. Abdominal aortic atherosclerosis: Noted on 2018 screening study.  Carotid arteries were normal and no evidence of aneurysm.  No evidence of PAD.  On low-dose aspirin.  LDL goal less than 70.    Patient to follow-up with Dr. Wright in 6 MONTHS or follow-up sooner if needed for any new, recurrent, or worsening symptoms or concerns.  Discussed in detail signs/symptoms that warrant sooner call or follow-up to the office or ER visit.           Your medication list          Accurate as of December 19, 2022  2:01 PM. If you have any questions,  ask your nurse or doctor.            CONTINUE taking these medications      Instructions Last Dose Given Next Dose Due   amLODIPine 5 MG tablet  Commonly known as: NORVASC      TAKE 1 TABLET BY MOUTH DAILY       aspirin 81 MG chewable tablet      Chew 81 mg Daily.       atorvastatin 20 MG tablet  Commonly known as: LIPITOR      Take 1 tablet by mouth Every Night.       ezetimibe 10 MG tablet  Commonly known as: ZETIA      TAKE 1 TABLET BY MOUTH DAILY       ferrous sulfate 325 (65 FE) MG tablet      Take 65 mg by mouth Daily With Breakfast.       furosemide 40 MG tablet  Commonly known as: LASIX      Take 40 mg by mouth Daily. 1 po Monday, Wednesday, and Friday       hydrALAZINE 50 MG tablet  Commonly known as: APRESOLINE      Take 50 mg by mouth 2 (Two) Times a Day.       magnesium oxide 400 (241.3 Mg) MG tablet tablet  Commonly known as: MAGOX      Take 400 mg by mouth Daily.       metoprolol succinate XL 50 MG 24 hr tablet  Commonly known as: TOPROL-XL      TAKE 1 TABLET BY MOUTH TWICE DAILY       VITAMIN D-3 PO      Take 2,000 Units by mouth Daily.              The above medication changes may not have been made by this provider.  Patient's medication list was updated to reflect medications they are currently taking including medication changes made by other providers.            Thanks,    Joana Christian, DNP, APRN  12/19/2022         Dictated utilizing Dragon dictation

## 2023-01-09 ENCOUNTER — HOSPITAL ENCOUNTER (OUTPATIENT)
Dept: GENERAL RADIOLOGY | Facility: HOSPITAL | Age: 78
Discharge: HOME | End: 2023-01-09
Payer: MEDICARE

## 2023-01-09 ENCOUNTER — OFFICE VISIT (OUTPATIENT)
Dept: INTERNAL MEDICINE | Facility: CLINIC | Age: 78
End: 2023-01-09
Payer: MEDICARE

## 2023-01-09 VITALS
BODY MASS INDEX: 30.13 KG/M2 | WEIGHT: 192 LBS | TEMPERATURE: 98.3 F | HEART RATE: 81 BPM | DIASTOLIC BLOOD PRESSURE: 74 MMHG | OXYGEN SATURATION: 97 % | HEIGHT: 67 IN | SYSTOLIC BLOOD PRESSURE: 122 MMHG

## 2023-01-09 DIAGNOSIS — M54.31 SCIATICA OF RIGHT SIDE: Primary | ICD-10-CM

## 2023-01-09 PROCEDURE — 72100 X-RAY EXAM L-S SPINE 2/3 VWS: CPT

## 2023-01-09 PROCEDURE — 99213 OFFICE O/P EST LOW 20 MIN: CPT

## 2023-01-09 RX ORDER — TIZANIDINE 4 MG/1
4 TABLET ORAL EVERY 8 HOURS PRN
Qty: 30 TABLET | Refills: 1 | Status: SHIPPED | OUTPATIENT
Start: 2023-01-09 | End: 2023-01-23

## 2023-01-09 RX ORDER — METHYLPREDNISOLONE 4 MG/1
TABLET ORAL
Qty: 21 TABLET | Refills: 0 | Status: SHIPPED | OUTPATIENT
Start: 2023-01-09 | End: 2023-03-01

## 2023-01-09 NOTE — PROGRESS NOTES
"Chief Complaint  Back Pain (Lower back through buttock down right leg)    Subjective        Tanya Greenwood presents to [facility]  History of Present Illness  77-year-old female presenting with back pain.  Patient Dr. Cross.  Pain started last Monday, 1/2, when getting out of the bed in the morning.  This morning she bent over and felt a sharp pain in her right lower back that then shot down her leg.  It has been painful to walk.  Has been treating with Biofreeze and Tylenol as well as a heating pad with some relief.  Denies numbness, incontinence, abdominal pain, fever, chest pain, difficulty breathing.        Objective   Vital Signs:  /74   Pulse 81   Temp 98.3 °F (36.8 °C)   Ht 170.2 cm (67\")   Wt 87.1 kg (192 lb)   SpO2 97%   BMI 30.07 kg/m²   Estimated body mass index is 30.07 kg/m² as calculated from the following:    Height as of this encounter: 170.2 cm (67\").    Weight as of this encounter: 87.1 kg (192 lb).          Physical Exam  Vitals reviewed.   Constitutional:       Appearance: Normal appearance.   HENT:      Head: Normocephalic.   Musculoskeletal:         General: Tenderness present.      Cervical back: Normal range of motion.      Lumbar back: Tenderness present. No swelling. Decreased range of motion.   Skin:     General: Skin is warm and dry.      Capillary Refill: Capillary refill takes less than 2 seconds.   Neurological:      General: No focal deficit present.      Mental Status: She is alert and oriented to person, place, and time.   Psychiatric:         Mood and Affect: Mood normal.         Behavior: Behavior normal.         Thought Content: Thought content normal.         Judgment: Judgment normal.        Result Review :    Common labs    Common Labs 6/3/22 6/3/22 6/3/22    1033 1033 1033   Glucose  99    BUN  22    Creatinine  1.53 (A)    Sodium  145 (A)    Potassium  4.5    Chloride  107 (A)    Calcium  10.0    Total Protein  7.3    Albumin  4.7 "    Total Bilirubin  0.6    Alkaline Phosphatase  90    AST (SGOT)  24    ALT (SGPT)  21    WBC 6.6     Hemoglobin 12.0     Hematocrit 37.3     Platelets 178     Total Cholesterol   209 (A)   Triglycerides   138   HDL Cholesterol   46   LDL Cholesterol    138 (A)   (A) Abnormal value            Current Outpatient Medications on File Prior to Visit   Medication Sig Dispense Refill   • amLODIPine (NORVASC) 5 MG tablet TAKE 1 TABLET BY MOUTH DAILY 90 tablet 3   • aspirin 81 MG chewable tablet Chew 81 mg Daily.     • atorvastatin (LIPITOR) 20 MG tablet Take 1 tablet by mouth Every Night. 90 tablet 0   • Cholecalciferol (VITAMIN D-3 PO) Take 2,000 Units by mouth Daily.     • ezetimibe (ZETIA) 10 MG tablet TAKE 1 TABLET BY MOUTH DAILY 90 tablet 3   • ferrous sulfate 325 (65 FE) MG tablet Take 65 mg by mouth Daily With Breakfast.     • furosemide (LASIX) 40 MG tablet Take 40 mg by mouth Daily. 1 po Monday, Wednesday, and Friday     • hydrALAZINE (APRESOLINE) 50 MG tablet Take 50 mg by mouth 2 (Two) Times a Day.     • magnesium oxide (MAGOX) 400 (241.3 Mg) MG tablet tablet Take 400 mg by mouth Daily.     • metoprolol succinate XL (TOPROL-XL) 50 MG 24 hr tablet TAKE 1 TABLET BY MOUTH TWICE DAILY 180 tablet 0     No current facility-administered medications on file prior to visit.                 Assessment and Plan   Diagnoses and all orders for this visit:    1. Sciatica of right side (Primary)  -     XR Spine Lumbar 2 or 3 View  -     tiZANidine (ZANAFLEX) 4 MG tablet; Take 1 tablet by mouth Every 8 (Eight) Hours As Needed for Muscle Spasms.  Dispense: 30 tablet; Refill: 1  -     methylPREDNISolone (MEDROL) 4 MG dose pack; Take as directed on package instructions.  Dispense: 21 tablet; Refill: 0          Patient Instructions   Start medrol dose pack and take as directed. Take tizanidine 3 times as needed for muscle spasm. Stretch throughout the day gently. Encourage ambulation. Avoid sitting for long periods of time. Apply  warm compress to area. Apply Biofreeze or IcyHot to area as needed. X-ray of low back [facility]. Follow-up as needed.         Follow Up   Return if symptoms worsen or fail to improve.  Patient was given instructions and counseling regarding her condition or for health maintenance advice. Please see specific information pulled into the AVS if appropriate.

## 2023-01-09 NOTE — PATIENT INSTRUCTIONS
Start medrol dose pack and take as directed. Take tizanidine 3 times as needed for muscle spasm. Stretch throughout the day gently. Encourage ambulation. Avoid sitting for long periods of time. Apply warm compress to area. Apply Biofreeze or IcyHot to area as needed. X-ray of low back to be completed at [facility]. Follow-up as needed.

## 2023-01-11 NOTE — PROGRESS NOTES
Degenerative disks and arthritis present.  No fractures or acute abnormalities present.  X-ray does not show likely cause for current symptoms.

## 2023-01-13 ENCOUNTER — TELEPHONE (OUTPATIENT)
Dept: INTERNAL MEDICINE | Facility: CLINIC | Age: 78
End: 2023-01-13
Payer: MEDICARE

## 2023-01-13 NOTE — TELEPHONE ENCOUNTER
Caller: Tanya Greenwood    Relationship: Self    Best call back number: [phone] (Mobile)    What medication are you requesting: PAIN MEDICATION FOR SEVERE PAIN    What are your current symptoms: SEVERE PAIN ON RIGHT SIDE AND HIP, TYLENOL AND MEDICATIONS THAT WERE GIVEN ARE NOT WORKING    How long have you been experiencing symptoms: FOR ABOUT 2 WEEKS    Have you had these symptoms before:    [] Yes  [x] No    Have you been treated for these symptoms before:   [] Yes  [x] No    If a prescription is needed, what is your preferred pharmacy and phone number: [id number] [place], [place] - [address] AT [address] - [phone] [facility] [phone] FX     Additional notes: PATIENT IS ASKING IF THIS CAN BE CALLED INTO THE PHARMACY ASAP, PLEASE ADVISE PATIENT IF THIS CAN BE CALLED INTO THE PHARMACY ASAP

## 2023-01-18 DIAGNOSIS — M54.31 SCIATICA OF RIGHT SIDE: Primary | ICD-10-CM

## 2023-01-18 RX ORDER — TRAMADOL HYDROCHLORIDE 50 MG/1
50 TABLET ORAL EVERY 6 HOURS PRN
Qty: 12 TABLET | Refills: 0 | Status: SHIPPED | OUTPATIENT
Start: 2023-01-18 | End: 2023-03-01

## 2023-01-22 DIAGNOSIS — M54.31 SCIATICA OF RIGHT SIDE: ICD-10-CM

## 2023-01-23 RX ORDER — TIZANIDINE 4 MG/1
TABLET ORAL
Qty: 30 TABLET | Refills: 1 | Status: SHIPPED | OUTPATIENT
Start: 2023-01-23 | End: 2023-02-20

## 2023-02-20 DIAGNOSIS — M54.31 SCIATICA OF RIGHT SIDE: ICD-10-CM

## 2023-02-20 RX ORDER — TIZANIDINE 4 MG/1
TABLET ORAL
Qty: 30 TABLET | Refills: 3 | Status: SHIPPED | OUTPATIENT
Start: 2023-02-20 | End: 2023-03-01 | Stop reason: SDUPTHER

## 2023-02-20 NOTE — TELEPHONE ENCOUNTER
Rx Refill Note  Requested Prescriptions     Pending Prescriptions Disp Refills   • tiZANidine (ZANAFLEX) 4 MG tablet [Pharmacy Med Name: TIZANIDINE 4MG TABLETS] 30 tablet 1     Sig: TAKE 1 TABLET BY MOUTH EVERY 8 HOURS AS NEEDED FOR MUSCLE SPASMS      Last office visit with prescribing clinician: 1/9/2023   Last telemedicine visit with prescribing clinician: 3/1/2023   Next office visit with prescribing clinician: Visit date not found                         Would you like a call back once the refill request has been completed: [] Yes [] No    If the office needs to give you a call back, can they leave a voicemail: [] Yes [] No    Bryanna Sun  02/20/23, 08:18 EST

## 2023-03-01 ENCOUNTER — OFFICE VISIT (OUTPATIENT)
Dept: INTERNAL MEDICINE | Facility: CLINIC | Age: 78
End: 2023-03-01
Payer: MEDICARE

## 2023-03-01 VITALS
WEIGHT: 187 LBS | TEMPERATURE: 97.8 F | RESPIRATION RATE: 18 BRPM | SYSTOLIC BLOOD PRESSURE: 120 MMHG | BODY MASS INDEX: 29.35 KG/M2 | OXYGEN SATURATION: 98 % | HEIGHT: 67 IN | HEART RATE: 72 BPM | DIASTOLIC BLOOD PRESSURE: 70 MMHG

## 2023-03-01 DIAGNOSIS — M54.50 CHRONIC MIDLINE LOW BACK PAIN WITHOUT SCIATICA: ICD-10-CM

## 2023-03-01 DIAGNOSIS — I10 ESSENTIAL HYPERTENSION: Primary | Chronic | ICD-10-CM

## 2023-03-01 DIAGNOSIS — M25.551 BILATERAL HIP PAIN: ICD-10-CM

## 2023-03-01 DIAGNOSIS — R60.9 PERIPHERAL EDEMA: ICD-10-CM

## 2023-03-01 DIAGNOSIS — N18.32 STAGE 3B CHRONIC KIDNEY DISEASE: Chronic | ICD-10-CM

## 2023-03-01 DIAGNOSIS — G89.29 CHRONIC MIDLINE LOW BACK PAIN WITHOUT SCIATICA: ICD-10-CM

## 2023-03-01 DIAGNOSIS — E78.00 HYPERCHOLESTEROLEMIA: Chronic | ICD-10-CM

## 2023-03-01 DIAGNOSIS — M25.552 BILATERAL HIP PAIN: ICD-10-CM

## 2023-03-01 PROCEDURE — 99214 OFFICE O/P EST MOD 30 MIN: CPT | Performed by: FAMILY MEDICINE

## 2023-03-01 RX ORDER — TIZANIDINE 4 MG/1
TABLET ORAL
Qty: 60 TABLET | Refills: 2 | Status: SHIPPED | OUTPATIENT
Start: 2023-03-01

## 2023-03-01 NOTE — PROGRESS NOTES
"Chief Complaint  Leg Pain, Hip Pain, Back Pain, and Follow-up (HTN, HLD, CKD)    Subjective        Tanya Greenwood presents to [facility]  History of Present Illness     Hypertension - stable.  Patient taking medication as prescribed.  Denies chest pain, shortness of breath, headache, lower extremity edema.  Patient is taking amlodipine, metoprolol, hydralazine.    She used the furosemide a few times a week to help with the peripheral edema.      HLD-stable.  Patient taking atorvastatin, Zetia as prescribed.  Trying to adhere to a low-fat diet.     Sees a renal specialist Dr. Kaur for chronic kidney disease.    Avoiding nephrotoxic agents and trying to stay hydrated through the day.  Due for lab check and forward results to Dr. Kaur.    She is still having some bilateral back, hip, gluteal pain.  Tramadol did not help but tizanidine did.  She gets some sedation with it.  We had previously ordered physical therapy for her to be done at [facility] but she did not get an appointment from them.  She is willing to do physical therapy and would like to do with [facility].    Objective   Vital Signs:  /70 (BP Location: Left arm, Patient Position: Sitting, Cuff Size: Adult)   Pulse 72   Temp 97.8 °F (36.6 °C)   Resp 18   Ht 170.2 cm (67\")   Wt 84.8 kg (187 lb)   SpO2 98%   BMI 29.29 kg/m²   Estimated body mass index is 29.29 kg/m² as calculated from the following:    Height as of this encounter: 170.2 cm (67\").    Weight as of this encounter: 84.8 kg (187 lb).             Physical Exam  Vitals and nursing note reviewed.   Constitutional:       General: She is not in acute distress.     Appearance: Normal appearance.   Cardiovascular:      Rate and Rhythm: Normal rate and regular rhythm.      Heart sounds: Normal heart sounds. No murmur heard.  Pulmonary:      Effort: Pulmonary effort is normal.      Breath sounds: Normal breath sounds.   Neurological:      Mental Status: She is " alert.        Result Review :  The following data was reviewed by: Garret Cross MD on 03/01/2023:  Common labs    Common Labs 6/3/22 6/3/22 6/3/22    1033 1033 1033   Glucose  99    BUN  22    Creatinine  1.53 (A)    Sodium  145 (A)    Potassium  4.5    Chloride  107 (A)    Calcium  10.0    Total Protein  7.3    Albumin  4.7    Total Bilirubin  0.6    Alkaline Phosphatase  90    AST (SGOT)  24    ALT (SGPT)  21    WBC 6.6     Hemoglobin 12.0     Hematocrit 37.3     Platelets 178     Total Cholesterol   209 (A)   Triglycerides   138   HDL Cholesterol   46   LDL Cholesterol    138 (A)   (A) Abnormal value                         Assessment and Plan   Diagnoses and all orders for this visit:    1. Essential hypertension (Primary)  -     CBC (No Diff); Future  -     Comprehensive Metabolic Panel; Future    2. Hypercholesterolemia  -     CBC (No Diff); Future  -     Comprehensive Metabolic Panel; Future  -     Lipid Panel With LDL / HDL Ratio; Future    3. Stage 3b chronic kidney disease (HCC)  -     CBC (No Diff); Future  -     Comprehensive Metabolic Panel; Future    4. Peripheral edema    5. Chronic midline low back pain without sciatica  -     Ambulatory Referral to Physical Therapy Evaluate and treat  -     tiZANidine (ZANAFLEX) 4 MG tablet; Take 1/2 to 1 tablet every 12 hours as needed for muscle spasms and back pain.  Take sparingly.  No driving on the medication.  Dispense: 60 tablet; Refill: 2    6. Bilateral hip pain  -     Ambulatory Referral to Physical Therapy Evaluate and treat  -     tiZANidine (ZANAFLEX) 4 MG tablet; Take 1/2 to 1 tablet every 12 hours as needed for muscle spasms and back pain.  Take sparingly.  No driving on the medication.  Dispense: 60 tablet; Refill: 2           Clinically stable chronic conditions as above except for the back/hip pain.  Continue all medications as above.  Labs reviewed/ordered as above.    I will order her some physical therapy to work on her back and hips.  I am  fine with continuing the tizanidine.  I did  her to use sparingly as the medication does cause sedation and other side effects such as risk of falls.  I am hoping that the physical therapy will get her functioning with less pain and therefore will not need the tizanidine.           Follow Up   Return in about 6 months (around 8/28/2023) for Medicare Wellness.  Patient was given instructions and counseling regarding her condition or for health maintenance advice. Please see specific information pulled into the AVS if appropriate.

## 2023-07-19 ENCOUNTER — TELEPHONE (OUTPATIENT)
Dept: PEDIATRICS | Facility: OTHER | Age: 78
End: 2023-07-19

## 2023-07-19 NOTE — TELEPHONE ENCOUNTER
Caller: Tanya Greenwood    Relationship: Self    Best call back number: [phone]    What medication are you requesting: COUGH MEDICATION     What are your current symptoms: PERSISTENT COUGH     How long have you been experiencing symptoms: 3 DAYS     Have you had these symptoms before:    [] Yes  [x] No    Have you been treated for these symptoms before:   [] Yes  [x] No    If a prescription is needed, what is your preferred pharmacy and phone number: [facility] #[id number] [address] - [phone] [facility] [phone]      Additional notes:   STATES THAT THE COUGH IS WORSE AT NIGHT AND HAS TRIED OVER THE COUNTER MEDICATION THAT HAS NOT BEEN HELPFUL

## 2023-08-03 DIAGNOSIS — I10 ESSENTIAL HYPERTENSION: ICD-10-CM

## 2023-08-03 RX ORDER — AMLODIPINE BESYLATE 5 MG/1
5 TABLET ORAL DAILY
Qty: 90 TABLET | Refills: 1 | Status: SHIPPED | OUTPATIENT
Start: 2023-08-03